# Patient Record
Sex: MALE | Race: WHITE | Employment: UNEMPLOYED | URBAN - METROPOLITAN AREA
[De-identification: names, ages, dates, MRNs, and addresses within clinical notes are randomized per-mention and may not be internally consistent; named-entity substitution may affect disease eponyms.]

---

## 2017-01-04 ENCOUNTER — TELEPHONE (OUTPATIENT)
Dept: PULMONOLOGY | Facility: CLINIC | Age: 42
End: 2017-01-04

## 2017-01-04 NOTE — TELEPHONE ENCOUNTER
Per pts mother, pt broke femur and is in rehab at [facility] in [place].  Pt will follow up with surgeon on 1/17/17. He needs to have form filled out for participation in [employer] and form expires 2/2/17.   Can pt be seen sometime between 1/17/17 a

## 2017-01-04 NOTE — TELEPHONE ENCOUNTER
Pt mother offered appt Mon 1/30 @ 4:45 pm, pt mother declined appt and stts she will cb at a later time.

## 2017-01-14 ENCOUNTER — NURSE ONLY (OUTPATIENT)
Dept: ALLERGY | Facility: CLINIC | Age: 42
End: 2017-01-14

## 2017-01-14 DIAGNOSIS — J30.89 ENVIRONMENTAL AND SEASONAL ALLERGIES: Primary | ICD-10-CM

## 2017-01-14 PROCEDURE — 95165 ANTIGEN THERAPY SERVICES: CPT | Performed by: ALLERGY & IMMUNOLOGY

## 2017-01-14 PROCEDURE — 95117 IMMUNOTHERAPY INJECTIONS: CPT | Performed by: ALLERGY & IMMUNOLOGY

## 2017-01-24 ENCOUNTER — TELEPHONE (OUTPATIENT)
Dept: PULMONOLOGY | Facility: CLINIC | Age: 42
End: 2017-01-24

## 2017-01-24 NOTE — TELEPHONE ENCOUNTER
Letter mailed to pt regarding MRI ABDOMEN due Feb 2017 from [facility], msg routed to [facility] please process authorization for CT.

## 2017-02-18 ENCOUNTER — OFFICE VISIT (OUTPATIENT)
Dept: ALLERGY | Facility: CLINIC | Age: 42
End: 2017-02-18

## 2017-02-18 ENCOUNTER — TELEPHONE (OUTPATIENT)
Dept: FAMILY MEDICINE CLINIC | Facility: CLINIC | Age: 42
End: 2017-02-18

## 2017-02-18 VITALS
SYSTOLIC BLOOD PRESSURE: 112 MMHG | DIASTOLIC BLOOD PRESSURE: 62 MMHG | BODY MASS INDEX: 22.13 KG/M2 | WEIGHT: 141 LBS | HEART RATE: 87 BPM | TEMPERATURE: 97 F | HEIGHT: 67 IN | RESPIRATION RATE: 18 BRPM

## 2017-02-18 DIAGNOSIS — J30.89 OTHER ALLERGIC RHINITIS: ICD-10-CM

## 2017-02-18 DIAGNOSIS — J30.1 SEASONAL ALLERGIC RHINITIS DUE TO POLLEN: Primary | ICD-10-CM

## 2017-02-18 PROCEDURE — G0463 HOSPITAL OUTPT CLINIC VISIT: HCPCS | Performed by: ALLERGY & IMMUNOLOGY

## 2017-02-18 PROCEDURE — 99214 OFFICE O/P EST MOD 30 MIN: CPT | Performed by: ALLERGY & IMMUNOLOGY

## 2017-02-18 RX ORDER — CARBAMAZEPINE 100 MG/1
400 TABLET, CHEWABLE ORAL
COMMUNITY

## 2017-02-18 RX ORDER — CARBAMAZEPINE 100 MG/1
300 TABLET, CHEWABLE ORAL
COMMUNITY

## 2017-02-18 RX ORDER — CARBAMAZEPINE 200 MG/1
400 TABLET ORAL
COMMUNITY

## 2017-02-18 RX ORDER — LOPERAMIDE HYDROCHLORIDE 2 MG/1
4 CAPSULE ORAL
COMMUNITY
End: 2021-04-05 | Stop reason: ALTCHOICE

## 2017-02-18 RX ORDER — M-VIT,TX,IRON,MINS/CALC/FOLIC 27MG-0.4MG
1 TABLET ORAL
COMMUNITY

## 2017-02-18 RX ORDER — PRIMIDONE 50 MG/1
200 TABLET ORAL
COMMUNITY

## 2017-02-18 NOTE — PROGRESS NOTES
Efren Ledesma is a 36year old male. HPI:   No chief complaint on file. Patient is a 40-year-old male who presents for follow-up with his mom with a chief complaint of allergies.     Patient last seen by me on December 30, 2015    Patient has a his 200 MG Oral Tab Take  by mouth. Disp:  Rfl:    EPINEPHrine (EPIPEN 2-SHADI) 0.3 MG/0.3ML Injection Solution Auto-injector inject 1 by Intramuscular route Disp:  Rfl:    Fluocinolone Acetonide (DERMOTIC) 0.01 % Otic Oil Place  in ear(s).  Disp:  Rfl:    gabape mucous membranes are moist   Neck/Thyroid: neck is supple without adenopathy  Lymphatic: no abnormal cervical, supraclavicular or axillary adenopathy is noted  Respiratory: normal to inspection lungs are clear to auscultation bilaterally normal respiratory

## 2017-02-18 NOTE — TELEPHONE ENCOUNTER
DRAGAN WAS SEEN ON 2/18 BY JED AND A SUMMARY NEEDS TO BE SENT TO [place] [address] FOR HIS VISIT. THE FAX  IS [phone].

## 2017-02-20 NOTE — TELEPHONE ENCOUNTER
LOV note from 2/08/17 faxed to [facility] [phone] with verification received of successful transmission.

## 2017-03-25 ENCOUNTER — HOSPITAL ENCOUNTER (OUTPATIENT)
Dept: MRI IMAGING | Facility: HOSPITAL | Age: 42
Discharge: HOME | End: 2017-03-25
Attending: INTERNAL MEDICINE
Payer: MEDICARE

## 2017-03-25 DIAGNOSIS — N28.89 KIDNEY MASS: ICD-10-CM

## 2017-03-25 LAB — CREAT BLD-MCNC: 1 MG/DL (ref 0.5–1.5)

## 2017-03-25 PROCEDURE — A9575 INJ GADOTERATE MEGLUMI 0.1ML: HCPCS | Performed by: INTERNAL MEDICINE

## 2017-03-25 PROCEDURE — 74183 MRI ABD W/O CNTR FLWD CNTR: CPT

## 2017-03-25 PROCEDURE — 82565 ASSAY OF CREATININE: CPT

## 2017-03-27 ENCOUNTER — TELEPHONE (OUTPATIENT)
Dept: PULMONOLOGY | Facility: CLINIC | Age: 42
End: 2017-03-27

## 2017-03-27 DIAGNOSIS — N28.89 KIDNEY MASS: Primary | ICD-10-CM

## 2017-03-27 NOTE — TELEPHONE ENCOUNTER
Left message for patient's mother Jessikalisa Swensons to call back.  Please transfer to x [phone]

## 2017-03-27 NOTE — TELEPHONE ENCOUNTER
----- Message from Huyen Lam MD sent at 3/26/2017  8:32 PM CDT -----  RN, these call the patient's mother to explain that the MRI of the abdomen demonstrates stability of the renal abnormalities.   Please arrange for a repeat MRI of the kidneys at th

## 2017-03-28 NOTE — TELEPHONE ENCOUNTER
Notified pt's mom of Dr. Smiley Sommers orders. Explained rx for repeat MRI will be mailed out about 1 mo prior to due date and two copies of MRI results will be mailed to home address per her request. She verbalized understanding.  Mailed results as requested and

## 2017-04-10 ENCOUNTER — OFFICE VISIT (OUTPATIENT)
Dept: PULMONOLOGY | Facility: CLINIC | Age: 42
End: 2017-04-10

## 2017-04-10 VITALS
DIASTOLIC BLOOD PRESSURE: 73 MMHG | OXYGEN SATURATION: 100 % | HEIGHT: 67 IN | RESPIRATION RATE: 18 BRPM | WEIGHT: 144.63 LBS | SYSTOLIC BLOOD PRESSURE: 123 MMHG | BODY MASS INDEX: 22.7 KG/M2 | HEART RATE: 85 BPM

## 2017-04-10 DIAGNOSIS — D64.9 ANEMIA, UNSPECIFIED TYPE: ICD-10-CM

## 2017-04-10 DIAGNOSIS — I10 ESSENTIAL HYPERTENSION: Primary | ICD-10-CM

## 2017-04-10 DIAGNOSIS — E78.5 DYSLIPIDEMIA: ICD-10-CM

## 2017-04-10 DIAGNOSIS — G40.802 OTHER EPILEPSY WITHOUT STATUS EPILEPTICUS, NOT INTRACTABLE (HCC): ICD-10-CM

## 2017-04-10 PROCEDURE — G0463 HOSPITAL OUTPT CLINIC VISIT: HCPCS | Performed by: INTERNAL MEDICINE

## 2017-04-10 PROCEDURE — 99213 OFFICE O/P EST LOW 20 MIN: CPT | Performed by: INTERNAL MEDICINE

## 2017-04-10 NOTE — PROGRESS NOTES
The patient is 79-year-old male who I know well from prior evaluations who comes in now for follow-up. The patient was hospitalized after fracturing his femur during a [employer] basketball game. He had surgical repair.   He convalesced at [facility]

## 2017-04-11 ENCOUNTER — TELEPHONE (OUTPATIENT)
Dept: ALLERGY | Facility: CLINIC | Age: 42
End: 2017-04-11

## 2017-04-11 NOTE — TELEPHONE ENCOUNTER
Mother calling states pt serum will  this week. Pt will need new rx for serum. Please call when serum is ready for pickup, please leave detailed message on voice mail. Pt leaves out of states and she needs to  latest on .

## 2017-04-11 NOTE — TELEPHONE ENCOUNTER
Left detailed message on answering machine as requested. Serum will be ready for  Thursday the 13th in the afternoon.

## 2017-04-13 ENCOUNTER — TELEPHONE (OUTPATIENT)
Dept: ALLERGY | Facility: CLINIC | Age: 42
End: 2017-04-13

## 2017-04-13 ENCOUNTER — NURSE ONLY (OUTPATIENT)
Dept: ALLERGY | Facility: CLINIC | Age: 42
End: 2017-04-13

## 2017-04-13 DIAGNOSIS — J30.89 ENVIRONMENTAL AND SEASONAL ALLERGIES: Primary | ICD-10-CM

## 2017-04-13 PROCEDURE — 95165 ANTIGEN THERAPY SERVICES: CPT | Performed by: ALLERGY & IMMUNOLOGY

## 2017-04-13 NOTE — TELEPHONE ENCOUNTER
Mother of pt picked up serum and consent forms signed and residence address and phone number verified. Mother reminded to keep serum refrigerated.  aware and signed forms. Routed to  for sign off.

## 2017-04-13 NOTE — PROGRESS NOTES
Pt's mother here to  serum for AIT to be taken to [address] [place] [address]. Dr aware, paperwork signed, consent signed and signed by Dr. Mother reminded that serum needs to be kept refrigerated. Original injection record given to mother.

## 2017-04-18 ENCOUNTER — TELEPHONE (OUTPATIENT)
Dept: ALLERGY | Facility: CLINIC | Age: 42
End: 2017-04-18

## 2017-04-18 NOTE — TELEPHONE ENCOUNTER
Left message on RN direct line to have past injection sheet faxed to our department. Gave our fax number.

## 2017-07-02 ENCOUNTER — LAB ENCOUNTER (OUTPATIENT)
Dept: LAB | Facility: HOSPITAL | Age: 42
End: 2017-07-02
Attending: ORTHOPAEDIC SURGERY
Payer: MEDICARE

## 2017-07-02 DIAGNOSIS — E78.5 DYSLIPIDEMIA: ICD-10-CM

## 2017-07-02 DIAGNOSIS — D64.9 ANEMIA, UNSPECIFIED TYPE: ICD-10-CM

## 2017-07-02 DIAGNOSIS — G40.802 OTHER EPILEPSY WITHOUT STATUS EPILEPTICUS, NOT INTRACTABLE (HCC): ICD-10-CM

## 2017-07-02 DIAGNOSIS — I10 ESSENTIAL HYPERTENSION: ICD-10-CM

## 2017-07-02 LAB
ALBUMIN SERPL BCP-MCNC: 4 G/DL (ref 3.5–4.8)
ALBUMIN/GLOB SERPL: 1.3 {RATIO} (ref 1–2)
ALP SERPL-CCNC: 79 U/L (ref 32–100)
ALT SERPL-CCNC: 23 U/L (ref 17–63)
ANION GAP SERPL CALC-SCNC: 9 MMOL/L (ref 0–18)
AST SERPL-CCNC: 25 U/L (ref 15–41)
BACTERIA UR QL AUTO: NEGATIVE /HPF
BASOPHILS # BLD: 0 K/UL (ref 0–0.2)
BASOPHILS NFR BLD: 1 %
BILIRUB SERPL-MCNC: 0.5 MG/DL (ref 0.3–1.2)
BILIRUB UR QL: NEGATIVE
BUN SERPL-MCNC: 14 MG/DL (ref 8–20)
BUN/CREAT SERPL: 15.9 (ref 10–20)
CALCIUM SERPL-MCNC: 8.9 MG/DL (ref 8.5–10.5)
CARBAMAZEPINE SERPL-MCNC: 7.1 MCG/ML (ref 4–12)
CHLORIDE SERPL-SCNC: 98 MMOL/L (ref 95–110)
CHOLEST SERPL-MCNC: 220 MG/DL (ref 110–200)
CLARITY UR: CLEAR
CO2 SERPL-SCNC: 28 MMOL/L (ref 22–32)
COLOR UR: YELLOW
CREAT SERPL-MCNC: 0.88 MG/DL (ref 0.5–1.5)
EOSINOPHIL # BLD: 0.1 K/UL (ref 0–0.7)
EOSINOPHIL NFR BLD: 2 %
ERYTHROCYTE [DISTWIDTH] IN BLOOD BY AUTOMATED COUNT: 13.4 % (ref 11–15)
GLOBULIN PLAS-MCNC: 3.1 G/DL (ref 2.5–3.7)
GLUCOSE SERPL-MCNC: 90 MG/DL (ref 70–99)
GLUCOSE UR-MCNC: NEGATIVE MG/DL
HCT VFR BLD AUTO: 41.1 % (ref 41–52)
HDLC SERPL-MCNC: 60 MG/DL
HGB BLD-MCNC: 14.2 G/DL (ref 13.5–17.5)
HGB UR QL STRIP.AUTO: NEGATIVE
KETONES UR-MCNC: NEGATIVE MG/DL
LDLC SERPL CALC-MCNC: 143 MG/DL (ref 0–99)
LYMPHOCYTES # BLD: 1.4 K/UL (ref 1–4)
LYMPHOCYTES NFR BLD: 33 %
MCH RBC QN AUTO: 30.6 PG (ref 27–32)
MCHC RBC AUTO-ENTMCNC: 34.6 G/DL (ref 32–37)
MCV RBC AUTO: 88.5 FL (ref 80–100)
MONOCYTES # BLD: 0.5 K/UL (ref 0–1)
MONOCYTES NFR BLD: 11 %
NEUTROPHILS # BLD AUTO: 2.3 K/UL (ref 1.8–7.7)
NEUTROPHILS NFR BLD: 53 %
NITRITE UR QL STRIP.AUTO: NEGATIVE
NONHDLC SERPL-MCNC: 160 MG/DL
OSMOLALITY UR CALC.SUM OF ELEC: 280 MOSM/KG (ref 275–295)
PH UR: 7 [PH] (ref 5–8)
PLATELET # BLD AUTO: 251 K/UL (ref 140–400)
PMV BLD AUTO: 7.4 FL (ref 7.4–10.3)
POTASSIUM SERPL-SCNC: 3.8 MMOL/L (ref 3.3–5.1)
PROT SERPL-MCNC: 7.1 G/DL (ref 5.9–8.4)
PROT UR-MCNC: NEGATIVE MG/DL
RBC # BLD AUTO: 4.64 M/UL (ref 4.5–5.9)
RBC #/AREA URNS AUTO: <1 /HPF
SODIUM SERPL-SCNC: 135 MMOL/L (ref 136–144)
SP GR UR STRIP: 1.02 (ref 1–1.03)
TRIGL SERPL-MCNC: 85 MG/DL (ref 1–149)
UROBILINOGEN UR STRIP-ACNC: <2
WBC # BLD AUTO: 4.3 K/UL (ref 4–11)
WBC #/AREA URNS AUTO: <1 /HPF

## 2017-07-02 PROCEDURE — 80061 LIPID PANEL: CPT

## 2017-07-02 PROCEDURE — 80053 COMPREHEN METABOLIC PANEL: CPT

## 2017-07-02 PROCEDURE — 80156 ASSAY CARBAMAZEPINE TOTAL: CPT

## 2017-07-02 PROCEDURE — 80188 ASSAY OF PRIMIDONE: CPT

## 2017-07-02 PROCEDURE — 85025 COMPLETE CBC W/AUTO DIFF WBC: CPT

## 2017-07-02 PROCEDURE — 36415 COLL VENOUS BLD VENIPUNCTURE: CPT

## 2017-07-02 PROCEDURE — 80184 ASSAY OF PHENOBARBITAL: CPT

## 2017-07-04 LAB
PHENOBARBITAL: 9.2 UG/ML
PRIMIDONE (MYSOLINE): 4.5 UG/ML

## 2017-07-10 ENCOUNTER — TELEPHONE (OUTPATIENT)
Dept: PULMONOLOGY | Facility: CLINIC | Age: 42
End: 2017-07-10

## 2017-07-10 DIAGNOSIS — E78.00 ELEVATED CHOLESTEROL: Primary | ICD-10-CM

## 2017-07-10 NOTE — TELEPHONE ENCOUNTER
----- Message from Jeanette Phoenix, MD sent at 7/7/2017  8:52 PM CDT -----  RN, please call the patient's mother to let her know that the drug levels were good.     Notes Recorded by Makenna Loya MD on 7/3/2017 at 10:47 PM CDT  RN, please call the [place]

## 2017-07-10 NOTE — TELEPHONE ENCOUNTER
----- Message from Genna Gonzales MD sent at 7/3/2017 10:47 PM CDT -----  RN, please call the patient's mother to explain that all the lab testing is fine except the cholesterol is up a little bit to 220.   Please increase the pravastatin from 10 mg to 20

## 2017-07-11 NOTE — TELEPHONE ENCOUNTER
Notified Ovidio of Dr. Shae Adam orders. Pt's mom requests all lab results from 7/2 be mailed to her home.  She stts she would like to know if increase in Pravastatin is necessary since pt has not been active, he has osteotomy sched on 7/24, & it is ok to keon

## 2017-07-11 NOTE — TELEPHONE ENCOUNTER
Notified pt's mom of Dr. Heri Norton orders. Explained lab orders will be mailed out about 1 mo prior to due date & I will have MD send script to [facility] per her request. She verbalized understanding. Rx(s) filed in Chronic Calendar.  Dr. Coral Torres- pls sign o

## 2017-07-11 NOTE — TELEPHONE ENCOUNTER
RN, I definitely would increase the pravastatin still. There is no point in taking medication unless it is adequately dosed. Certainly, you can send her the lab results.

## 2017-07-12 RX ORDER — PRAVASTATIN SODIUM 20 MG
20 TABLET ORAL NIGHTLY
Qty: 30 TABLET | Refills: 5 | Status: SHIPPED | OUTPATIENT
Start: 2017-07-12

## 2017-07-13 ENCOUNTER — TELEPHONE (OUTPATIENT)
Dept: PULMONOLOGY | Facility: CLINIC | Age: 42
End: 2017-07-13

## 2017-07-13 NOTE — TELEPHONE ENCOUNTER
Pts mother called with fax:[phone] number for updating. Pls fax new RX:Pravastatin Sodium  Mother also indicates she has not received lab results, asking to [facility] fax lab results as well, any questions pls call at: [phone],FZQF.   Mother also sidra

## 2017-07-14 ENCOUNTER — TELEPHONE (OUTPATIENT)
Dept: PULMONOLOGY | Facility: CLINIC | Age: 42
End: 2017-07-14

## 2017-07-14 NOTE — TELEPHONE ENCOUNTER
Current Outpatient Prescriptions:  lisinopril (PRINIVIL,ZESTRIL) 20 MG Oral Tab Take  by mouth.  Disp:  Rfl:      Refill

## 2017-07-14 NOTE — TELEPHONE ENCOUNTER
Spoke to David Carcamo re: request. She verbalized understanding. Pt's mom stts C[facility] handles Minesh's medications. She stts she will be going to [place], she will d/w nurse, & only f/u with us if pt needs medication.

## 2017-08-14 ENCOUNTER — NURSE ONLY (OUTPATIENT)
Dept: ALLERGY | Facility: CLINIC | Age: 42
End: 2017-08-14

## 2017-08-14 DIAGNOSIS — J30.89 ENVIRONMENTAL AND SEASONAL ALLERGIES: ICD-10-CM

## 2017-08-14 PROCEDURE — 95117 IMMUNOTHERAPY INJECTIONS: CPT | Performed by: ALLERGY & IMMUNOLOGY

## 2017-08-21 ENCOUNTER — TELEPHONE (OUTPATIENT)
Dept: PULMONOLOGY | Facility: CLINIC | Age: 42
End: 2017-08-21

## 2017-08-21 NOTE — TELEPHONE ENCOUNTER
[facility] calling to let Dr know that pt is [facility]ing [facility]

## 2017-10-05 ENCOUNTER — TELEPHONE (OUTPATIENT)
Dept: ALLERGY | Facility: CLINIC | Age: 42
End: 2017-10-05

## 2017-10-05 NOTE — TELEPHONE ENCOUNTER
Patient need more allergy serum -  He need more in a few weeks. David Rosenberg serum is expiring need new vials. Can  next monday   evening or Saturday within the next two to three weeks.

## 2017-10-19 ENCOUNTER — APPOINTMENT (OUTPATIENT)
Dept: ALLERGY | Facility: CLINIC | Age: 42
End: 2017-10-19

## 2017-10-19 DIAGNOSIS — J30.89 ENVIRONMENTAL AND SEASONAL ALLERGIES: ICD-10-CM

## 2017-10-19 PROCEDURE — 95165 ANTIGEN THERAPY SERVICES: CPT | Performed by: ALLERGY & IMMUNOLOGY

## 2017-10-19 NOTE — TELEPHONE ENCOUNTER
Mother in to  serum and take to patient's [facility] health services. Consent signed and reminded to keep serum refrigerated. Encounter for nurse visit made to charge for serum release.

## 2017-11-04 ENCOUNTER — TELEPHONE (OUTPATIENT)
Dept: ALLERGY | Facility: CLINIC | Age: 42
End: 2017-11-04

## 2017-11-04 NOTE — TELEPHONE ENCOUNTER
Received AIT injection record from [facility]. Placed in pt AIT chart for Ana Arceo to review.

## 2017-12-29 ENCOUNTER — TELEPHONE (OUTPATIENT)
Dept: PULMONOLOGY | Facility: CLINIC | Age: 42
End: 2017-12-29

## 2018-01-21 ENCOUNTER — LAB ENCOUNTER (OUTPATIENT)
Dept: LAB | Facility: HOSPITAL | Age: 43
End: 2018-01-21
Attending: Other
Payer: MEDICARE

## 2018-01-21 DIAGNOSIS — G40.909 EPILEPSY (HCC): Primary | ICD-10-CM

## 2018-01-21 DIAGNOSIS — E78.00 ELEVATED CHOLESTEROL: ICD-10-CM

## 2018-01-21 LAB
ALBUMIN SERPL BCP-MCNC: 4.1 G/DL (ref 3.5–4.8)
ALP SERPL-CCNC: 68 U/L (ref 32–100)
ALT SERPL-CCNC: 23 U/L (ref 17–63)
ANION GAP SERPL CALC-SCNC: 8 MMOL/L (ref 0–18)
AST SERPL-CCNC: 24 U/L (ref 15–41)
BASOPHILS # BLD: 0 K/UL (ref 0–0.2)
BASOPHILS NFR BLD: 1 %
BILIRUB DIRECT SERPL-MCNC: 0 MG/DL (ref 0–0.2)
BILIRUB SERPL-MCNC: 0.6 MG/DL (ref 0.3–1.2)
BUN SERPL-MCNC: 16 MG/DL (ref 8–20)
BUN/CREAT SERPL: 18.4 (ref 10–20)
CALCIUM SERPL-MCNC: 8.7 MG/DL (ref 8.5–10.5)
CARBAMAZEPINE SERPL-MCNC: 7.4 MCG/ML (ref 4–12)
CHLORIDE SERPL-SCNC: 98 MMOL/L (ref 95–110)
CHOLEST SERPL-MCNC: 202 MG/DL (ref 110–200)
CO2 SERPL-SCNC: 29 MMOL/L (ref 22–32)
CREAT SERPL-MCNC: 0.87 MG/DL (ref 0.5–1.5)
EOSINOPHIL # BLD: 0.1 K/UL (ref 0–0.7)
EOSINOPHIL NFR BLD: 2 %
ERYTHROCYTE [DISTWIDTH] IN BLOOD BY AUTOMATED COUNT: 13.6 % (ref 11–15)
GLUCOSE SERPL-MCNC: 92 MG/DL (ref 70–99)
HCT VFR BLD AUTO: 41.8 % (ref 41–52)
HDLC SERPL-MCNC: 53 MG/DL
HGB BLD-MCNC: 14.1 G/DL (ref 13.5–17.5)
LDLC SERPL CALC-MCNC: 128 MG/DL (ref 0–99)
LYMPHOCYTES # BLD: 1.3 K/UL (ref 1–4)
LYMPHOCYTES NFR BLD: 31 %
MCH RBC QN AUTO: 30.1 PG (ref 27–32)
MCHC RBC AUTO-ENTMCNC: 33.7 G/DL (ref 32–37)
MCV RBC AUTO: 89.3 FL (ref 80–100)
MONOCYTES # BLD: 0.4 K/UL (ref 0–1)
MONOCYTES NFR BLD: 11 %
NEUTROPHILS # BLD AUTO: 2.3 K/UL (ref 1.8–7.7)
NEUTROPHILS NFR BLD: 55 %
NONHDLC SERPL-MCNC: 149 MG/DL
OSMOLALITY UR CALC.SUM OF ELEC: 281 MOSM/KG (ref 275–295)
PHENOBARB SERPL-MCNC: 9.1 MCG/ML (ref 15–40)
PLATELET # BLD AUTO: 266 K/UL (ref 140–400)
PMV BLD AUTO: 7.2 FL (ref 7.4–10.3)
POTASSIUM SERPL-SCNC: 4 MMOL/L (ref 3.3–5.1)
PROT SERPL-MCNC: 7.3 G/DL (ref 5.9–8.4)
RBC # BLD AUTO: 4.68 M/UL (ref 4.5–5.9)
SODIUM SERPL-SCNC: 135 MMOL/L (ref 136–144)
TRIGL SERPL-MCNC: 107 MG/DL (ref 1–149)
WBC # BLD AUTO: 4.1 K/UL (ref 4–11)

## 2018-01-21 PROCEDURE — 80061 LIPID PANEL: CPT

## 2018-01-21 PROCEDURE — 80184 ASSAY OF PHENOBARBITAL: CPT

## 2018-01-21 PROCEDURE — 80076 HEPATIC FUNCTION PANEL: CPT

## 2018-01-21 PROCEDURE — 85025 COMPLETE CBC W/AUTO DIFF WBC: CPT

## 2018-01-21 PROCEDURE — 80156 ASSAY CARBAMAZEPINE TOTAL: CPT

## 2018-01-21 PROCEDURE — 36415 COLL VENOUS BLD VENIPUNCTURE: CPT

## 2018-01-21 PROCEDURE — 80188 ASSAY OF PRIMIDONE: CPT

## 2018-01-21 PROCEDURE — 80171 DRUG SCREEN QUANT GABAPENTIN: CPT

## 2018-01-21 PROCEDURE — 80048 BASIC METABOLIC PNL TOTAL CA: CPT

## 2018-01-23 LAB
PHENOBARBITAL: 10.3 UG/ML
PRIMIDONE (MYSOLINE): 4.3 UG/ML

## 2018-01-25 LAB — GABAPENTIN LEVEL: 4.4 UG/ML

## 2018-01-29 ENCOUNTER — TELEPHONE (OUTPATIENT)
Dept: PULMONOLOGY | Facility: CLINIC | Age: 43
End: 2018-01-29

## 2018-01-29 NOTE — TELEPHONE ENCOUNTER
----- Message from Xiomy Lopez MD sent at 1/28/2018  5:21 PM CST -----  RN,  Please blayne the mother Melody Perales to let her know that the values are acceptable and the cholester is down from last testing to 202.   Also, fax results to Neurologist Dr. Jonathan Hurt

## 2018-01-29 NOTE — TELEPHONE ENCOUNTER
Pt mother Mark Cardoso informed of results, faxed to neurologist Dr. Prentice Leventhal and [facility] at [phone] as mother requested, and a copy of lab results placed in outgoing mail.

## 2018-02-15 ENCOUNTER — TELEPHONE (OUTPATIENT)
Dept: PULMONOLOGY | Facility: CLINIC | Age: 43
End: 2018-02-15

## 2018-03-02 ENCOUNTER — TELEPHONE (OUTPATIENT)
Dept: ALLERGY | Facility: CLINIC | Age: 43
End: 2018-03-02

## 2018-03-02 NOTE — TELEPHONE ENCOUNTER
[address] (pt's nurse) is calling to inform DR that pt did  Receive his allergy shots today except one. She states he didn't received the ragweed b/c it  on 18. She is calling to obtain directions. pls advise.  Thank you

## 2018-03-03 NOTE — TELEPHONE ENCOUNTER
Left message for nurse DARRYL Solares to call back. Office open until noon today. Please transfer call to RN.

## 2018-03-03 NOTE — TELEPHONE ENCOUNTER
Dr. Cheryle Ripple,    Please see message below from [place], [address] [facility]. Per AIT records, patient currently on Red (1:100/10,000 BAU) maintenance every 4 weeks for mold, dust mites, ragweed (injection 1) & trees, grass, weeds (injection 2). Please advise.

## 2018-03-03 NOTE — TELEPHONE ENCOUNTER
Call transferred by [facility]. Per mom, spoke with DARRYL Solares regarding  Ragweed serum. Mother stated patient's  AIT serums are all expiring end of . Mother requesting for new AIT serums. Stated would like to  serums for Gloria Cordoba on 3/12/18. Per mom, she can only come  serums either on a Monday or Saturday d/t her work schedule. Mom aware that our office will contact [facility] again on Monday regarding giving Ragweed injection out of current vial if that still have it. We will contact her once new AIT serums are ready for . Message will be relayed to Ramakrishna Amaya RN regarding new AIT serum request for Gloria Cordoba. She verbalized understanding.

## 2018-03-05 NOTE — TELEPHONE ENCOUNTER
Spoke with Eveline RN told her she may give Jorge Eddy his ragweed dose today, Dr Kandi Lantigua use of serum. Nurse states pt received his last ragweed injection on 2/1/2018 and they held it when he was in on 3/1/2018. Pt did receive the other allergens at that time. Told nurse, will be making pt's new vials this week and sending them with mother as requested on the 12th of March. Routed to  for sign off.

## 2018-03-08 ENCOUNTER — TELEPHONE (OUTPATIENT)
Dept: ALLERGY | Facility: CLINIC | Age: 43
End: 2018-03-08

## 2018-03-08 NOTE — TELEPHONE ENCOUNTER
Left message that serum is ready and in the dept. refrigerator and that pt is due to see Dr for yearly AIT visit.

## 2018-03-12 ENCOUNTER — NURSE ONLY (OUTPATIENT)
Dept: ALLERGY | Facility: CLINIC | Age: 43
End: 2018-03-12

## 2018-03-12 DIAGNOSIS — J30.89 ENVIRONMENTAL AND SEASONAL ALLERGIES: ICD-10-CM

## 2018-03-12 PROCEDURE — 95165 ANTIGEN THERAPY SERVICES: CPT | Performed by: ALLERGY & IMMUNOLOGY

## 2018-03-12 NOTE — PROGRESS NOTES
Mother here to  serum for pt's allergy injections done at [facility]. Reminded to keep serum refrigerated and injection sheet included.

## 2018-03-30 ENCOUNTER — HOSPITAL ENCOUNTER (OUTPATIENT)
Dept: MRI IMAGING | Facility: HOSPITAL | Age: 43
Discharge: HOME | End: 2018-03-30
Attending: INTERNAL MEDICINE
Payer: MEDICARE

## 2018-03-30 DIAGNOSIS — N28.89 KIDNEY MASS: ICD-10-CM

## 2018-03-30 PROCEDURE — A9576 INJ PROHANCE MULTIPACK: HCPCS | Performed by: INTERNAL MEDICINE

## 2018-03-30 PROCEDURE — 82565 ASSAY OF CREATININE: CPT

## 2018-03-30 PROCEDURE — 74183 MRI ABD W/O CNTR FLWD CNTR: CPT | Performed by: INTERNAL MEDICINE

## 2018-04-02 ENCOUNTER — TELEPHONE (OUTPATIENT)
Dept: PULMONOLOGY | Facility: CLINIC | Age: 43
End: 2018-04-02

## 2018-04-02 NOTE — TELEPHONE ENCOUNTER
----- Message from Kailee Anand MD sent at 3/31/2018  9:28 PM CDT -----  RN, please call the mother to let her know that the MRI of the abdomen showed that the kidney lesions were stable.   Please add to the calendar a repeat MRI of the abdomen at the two-year interval.

## 2018-04-02 NOTE — TELEPHONE ENCOUNTER
Patient's mother was called. LMTCB regarding MRI of Abdomen results. Please transfer call to Pulmonary RN at ext. [phone].  Thanks !!

## 2018-04-02 NOTE — TELEPHONE ENCOUNTER
Patient's mother was called. Message from Dr. Leroy Fischer dated 03/31/18 @ 9:28 pm was explained to mother in its entirety. Mrs. Estrada verbalized understanding and agreed to recommendations made by MD. No further questions / concerns at this time.

## 2018-04-17 ENCOUNTER — TELEPHONE (OUTPATIENT)
Dept: PULMONOLOGY | Facility: CLINIC | Age: 43
End: 2018-04-17

## 2018-04-17 NOTE — TELEPHONE ENCOUNTER
Pts Mother/Alicia calling on behalf of pt requesting a letter from  with pts name to indicate that he is development disabled since birth, and that he can not care for himself (pt is being audited). Any questions pls call mother at:[phone],thanks.   *R

## 2018-04-18 NOTE — TELEPHONE ENCOUNTER
Spoke w/ pt's mom re: her request. Explained will d/w MD, f/u, & she may p/u letter when it is ready @ . Hours given (Mon until 6 pm and T & TH until 6:30 pm per Milo Beltran). Skylar Díaz verbalized understanding. She stts ok to leave detailed msg.  Dr. Kelly Wesley

## 2018-04-19 NOTE — TELEPHONE ENCOUNTER
Signed letter placed in envelope w/ pt identifiers as well as his mom's name and filed @  for p/u. Msg left for pt's mom re: the previous & if she has any questions to call me back @ #[phone].

## 2018-04-21 ENCOUNTER — OFFICE VISIT (OUTPATIENT)
Dept: ALLERGY | Facility: CLINIC | Age: 43
End: 2018-04-21

## 2018-04-21 VITALS
HEART RATE: 67 BPM | SYSTOLIC BLOOD PRESSURE: 110 MMHG | RESPIRATION RATE: 16 BRPM | HEIGHT: 67 IN | WEIGHT: 155 LBS | BODY MASS INDEX: 24.33 KG/M2 | DIASTOLIC BLOOD PRESSURE: 76 MMHG | OXYGEN SATURATION: 97 % | TEMPERATURE: 98 F

## 2018-04-21 DIAGNOSIS — J30.1 SEASONAL ALLERGIC RHINITIS DUE TO POLLEN: Primary | ICD-10-CM

## 2018-04-21 DIAGNOSIS — J30.89 OTHER ALLERGIC RHINITIS: ICD-10-CM

## 2018-04-21 PROCEDURE — 99214 OFFICE O/P EST MOD 30 MIN: CPT | Performed by: ALLERGY & IMMUNOLOGY

## 2018-04-21 PROCEDURE — G0463 HOSPITAL OUTPT CLINIC VISIT: HCPCS | Performed by: ALLERGY & IMMUNOLOGY

## 2018-04-21 NOTE — PROGRESS NOTES
Roz Lujan is a 39year old male. HPI:   Patient presents with: Allergies: Pt reports he is feeling well. Has been getting his shots every four weeks. NO complaints to note.      Patient is a 50-year-old male who presents for follow-up with klever sweet Disp: 30 tablet Rfl: 5   primidone 50 MG Oral Tab Take 200 mg by mouth. Disp:  Rfl:    Loperamide HCl 2 MG Oral Cap Take 4 mg by mouth. Disp:  Rfl:    THERAPEUTIC MULTIVIT/MINERAL Oral Tab Take 1 tablet by mouth.  Disp:  Rfl:    carbamazepine 100 MG Oral Ch heartbeat/palpitations, chest pain, edema  Constitutional:  Negative night sweats,weight loss, irritability and lethargy  ENMT:  Negative for ear drainage, hearing loss.  See hpi   Eyes:  Negative for eye discharge and vision loss  Gastrointestinal:  Collette Hamilton prescriptions requested or ordered in this encounter    Imaging & Referrals:  None     4/21/2018  Eben Medina MD    If medication samples were provided today, they were provided solely for patient education and training related to self administration

## 2018-05-08 ENCOUNTER — TELEPHONE (OUTPATIENT)
Dept: ALLERGY | Facility: CLINIC | Age: 43
End: 2018-05-08

## 2018-05-08 NOTE — TELEPHONE ENCOUNTER
Forms completed and faxed to [facility] [address] [place] [address]. Confirmation [id number]    Forms sent to scanning into this encounter.

## 2018-05-31 ENCOUNTER — TELEPHONE (OUTPATIENT)
Dept: PULMONOLOGY | Facility: CLINIC | Age: 43
End: 2018-05-31

## 2018-05-31 NOTE — TELEPHONE ENCOUNTER
Den Booker from [id number] [place] [address] Pt will be seen by physical therapist as per dr referral. Any questions please contact viki [phone]

## 2018-06-18 ENCOUNTER — TELEPHONE (OUTPATIENT)
Dept: ALLERGY | Facility: CLINIC | Age: 43
End: 2018-06-18

## 2018-06-18 NOTE — TELEPHONE ENCOUNTER
Pt's mother called in requesting to come in to  pt's grass serum on 7/2. Pt's mother stated that pt's current supply of serum expires on 7/15.   Please advise

## 2018-06-19 NOTE — TELEPHONE ENCOUNTER
Will mix grass on 6/28/2018 and call mother at that time to arrange time to refill for next 6 months.

## 2018-06-28 NOTE — TELEPHONE ENCOUNTER
Left message for Ms. Estrada. Patient's allergy serum ready for . Can come on Monday 7/2/18 9 AM-Noon or 1 PM-7 PM; Tuesday 7/3/18 9 AM-11:30 AM.    Office closed on 7/4/18 for Holiday. Please call with any questions or concerns.

## 2018-07-02 ENCOUNTER — NURSE ONLY (OUTPATIENT)
Dept: ALLERGY | Facility: CLINIC | Age: 43
End: 2018-07-02

## 2018-07-02 DIAGNOSIS — J30.89 ENVIRONMENTAL AND SEASONAL ALLERGIES: ICD-10-CM

## 2018-07-02 PROCEDURE — 95165 ANTIGEN THERAPY SERVICES: CPT | Performed by: ALLERGY & IMMUNOLOGY

## 2018-07-02 NOTE — PROGRESS NOTES
Mother here to  allergy grass serum to take to [facility]. Reminded to keep serum refrigerated. Parent stated understanding will return home to refrigerate serum.

## 2018-07-06 ENCOUNTER — TELEPHONE (OUTPATIENT)
Dept: PULMONOLOGY | Facility: CLINIC | Age: 43
End: 2018-07-06

## 2018-07-06 NOTE — TELEPHONE ENCOUNTER
Spoke w/ pt mother Breanna Valenzuela, she gave permission to forward LOV note and last labs lipid and LFT to Isabela/Stan as requested.  Breanna Valenzuela also scheduled an appt for Chanel Barnes to f/u w/ Dr. Breana Bai for 7/23 @ 5 pm. Pt mother requests make note on fax that Chanel Barnes

## 2018-07-06 NOTE — TELEPHONE ENCOUNTER
Seema/Cale requesting report of pts last physical and labs that were completed at visit.   Please fax to [phone] thank you

## 2018-07-17 NOTE — TELEPHONE ENCOUNTER
Mother requesting to have appt changed form 5/23/18 at 5pm to 5/24/18 at 5pm due to family coming in from out of town.  Please call thank you [phone] Mother states it is ok to leave a detailed message

## 2018-07-17 NOTE — TELEPHONE ENCOUNTER
Spoke w/ pt mother Raghavendra Gandara, informed her that Dr. Bret Spurling does not see pt on Tuesdays at 5 pm and that there is no other availability left for the week of 7/23 at this time, will check for cancellations.  Reassured pt mother Raghavendra Gandara that pt was added in addition

## 2018-07-23 ENCOUNTER — OFFICE VISIT (OUTPATIENT)
Dept: PULMONOLOGY | Facility: CLINIC | Age: 43
End: 2018-07-23
Payer: MEDICARE

## 2018-07-23 VITALS
RESPIRATION RATE: 18 BRPM | BODY MASS INDEX: 24.48 KG/M2 | SYSTOLIC BLOOD PRESSURE: 127 MMHG | DIASTOLIC BLOOD PRESSURE: 91 MMHG | WEIGHT: 156 LBS | OXYGEN SATURATION: 99 % | HEIGHT: 67 IN | HEART RATE: 81 BPM

## 2018-07-23 DIAGNOSIS — I10 ESSENTIAL HYPERTENSION: Primary | ICD-10-CM

## 2018-07-23 DIAGNOSIS — E78.5 DYSLIPIDEMIA: ICD-10-CM

## 2018-07-23 DIAGNOSIS — R56.9 SEIZURE (HCC): ICD-10-CM

## 2018-07-23 DIAGNOSIS — D64.9 ANEMIA, UNSPECIFIED TYPE: ICD-10-CM

## 2018-07-23 PROCEDURE — 99213 OFFICE O/P EST LOW 20 MIN: CPT | Performed by: INTERNAL MEDICINE

## 2018-07-23 PROCEDURE — G0463 HOSPITAL OUTPT CLINIC VISIT: HCPCS | Performed by: INTERNAL MEDICINE

## 2018-07-23 NOTE — PROGRESS NOTES
A 48-year-old male who I know well from prior evaluation and comes in now for follow-up. In general, he is doing well. He needs forms completed for [facility] and repeat blood draw. Review of systems: Vision normal on the right blind on the left.  Ea

## 2018-07-26 ENCOUNTER — LAB ENCOUNTER (OUTPATIENT)
Dept: LAB | Facility: HOSPITAL | Age: 43
End: 2018-07-26
Attending: INTERNAL MEDICINE
Payer: MEDICARE

## 2018-07-26 DIAGNOSIS — R56.9 SEIZURE (HCC): ICD-10-CM

## 2018-07-26 DIAGNOSIS — I10 ESSENTIAL HYPERTENSION: ICD-10-CM

## 2018-07-26 DIAGNOSIS — E78.5 DYSLIPIDEMIA: ICD-10-CM

## 2018-07-26 DIAGNOSIS — D64.9 ANEMIA, UNSPECIFIED TYPE: ICD-10-CM

## 2018-07-26 LAB
ALBUMIN SERPL BCP-MCNC: 3.7 G/DL (ref 3.5–4.8)
ALBUMIN/GLOB SERPL: 1 {RATIO} (ref 1–2)
ALP SERPL-CCNC: 73 U/L (ref 32–100)
ALT SERPL-CCNC: 29 U/L (ref 17–63)
ANION GAP SERPL CALC-SCNC: 8 MMOL/L (ref 0–18)
AST SERPL-CCNC: 23 U/L (ref 15–41)
BACTERIA UR QL AUTO: NEGATIVE /HPF
BASOPHILS # BLD: 0 K/UL (ref 0–0.2)
BASOPHILS NFR BLD: 1 %
BILIRUB DIRECT SERPL-MCNC: 0 MG/DL (ref 0–0.2)
BILIRUB SERPL-MCNC: 0.4 MG/DL (ref 0.3–1.2)
BILIRUB UR QL: NEGATIVE
BUN SERPL-MCNC: 11 MG/DL (ref 8–20)
BUN/CREAT SERPL: 12.5 (ref 10–20)
CALCIUM SERPL-MCNC: 8.7 MG/DL (ref 8.5–10.5)
CARBAMAZEPINE SERPL-MCNC: 8 MCG/ML (ref 4–12)
CHLORIDE SERPL-SCNC: 99 MMOL/L (ref 95–110)
CHOLEST SERPL-MCNC: 204 MG/DL (ref 110–200)
CO2 SERPL-SCNC: 27 MMOL/L (ref 22–32)
COLOR UR: YELLOW
CREAT SERPL-MCNC: 0.88 MG/DL (ref 0.5–1.5)
EOSINOPHIL # BLD: 0.1 K/UL (ref 0–0.7)
EOSINOPHIL NFR BLD: 3 %
ERYTHROCYTE [DISTWIDTH] IN BLOOD BY AUTOMATED COUNT: 13 % (ref 11–15)
GLOBULIN PLAS-MCNC: 3.7 G/DL (ref 2.5–3.7)
GLUCOSE SERPL-MCNC: 92 MG/DL (ref 70–99)
GLUCOSE UR-MCNC: NEGATIVE MG/DL
HCT VFR BLD AUTO: 39.3 % (ref 41–52)
HDLC SERPL-MCNC: 57 MG/DL
HGB BLD-MCNC: 13.6 G/DL (ref 13.5–17.5)
HGB UR QL STRIP.AUTO: NEGATIVE
KETONES UR-MCNC: NEGATIVE MG/DL
LDLC SERPL CALC-MCNC: 132 MG/DL (ref 0–99)
LEUKOCYTE ESTERASE UR QL STRIP.AUTO: NEGATIVE
LYMPHOCYTES # BLD: 1.4 K/UL (ref 1–4)
LYMPHOCYTES NFR BLD: 27 %
MCH RBC QN AUTO: 30.3 PG (ref 27–32)
MCHC RBC AUTO-ENTMCNC: 34.6 G/DL (ref 32–37)
MCV RBC AUTO: 87.8 FL (ref 80–100)
MONOCYTES # BLD: 0.6 K/UL (ref 0–1)
MONOCYTES NFR BLD: 11 %
NEUTROPHILS # BLD AUTO: 2.9 K/UL (ref 1.8–7.7)
NEUTROPHILS NFR BLD: 58 %
NITRITE UR QL STRIP.AUTO: NEGATIVE
NONHDLC SERPL-MCNC: 147 MG/DL
OSMOLALITY UR CALC.SUM OF ELEC: 277 MOSM/KG (ref 275–295)
PATIENT FASTING: YES
PH UR: 8 [PH] (ref 5–8)
PLATELET # BLD AUTO: 354 K/UL (ref 140–400)
PMV BLD AUTO: 6.7 FL (ref 7.4–10.3)
POTASSIUM SERPL-SCNC: 3.9 MMOL/L (ref 3.3–5.1)
PROT SERPL-MCNC: 7.4 G/DL (ref 5.9–8.4)
PROT UR-MCNC: NEGATIVE MG/DL
RBC # BLD AUTO: 4.47 M/UL (ref 4.5–5.9)
RBC #/AREA URNS AUTO: 2 /HPF
SODIUM SERPL-SCNC: 134 MMOL/L (ref 136–144)
SP GR UR STRIP: 1.02 (ref 1–1.03)
TRIGL SERPL-MCNC: 76 MG/DL (ref 1–149)
UROBILINOGEN UR STRIP-ACNC: <2
VIT C UR-MCNC: NEGATIVE MG/DL
WBC # BLD AUTO: 5 K/UL (ref 4–11)
WBC #/AREA URNS AUTO: 1 /HPF

## 2018-07-26 PROCEDURE — 82248 BILIRUBIN DIRECT: CPT

## 2018-07-26 PROCEDURE — 80053 COMPREHEN METABOLIC PANEL: CPT

## 2018-07-26 PROCEDURE — 36415 COLL VENOUS BLD VENIPUNCTURE: CPT

## 2018-07-26 PROCEDURE — 81003 URINALYSIS AUTO W/O SCOPE: CPT

## 2018-07-26 PROCEDURE — 80061 LIPID PANEL: CPT

## 2018-07-26 PROCEDURE — 80156 ASSAY CARBAMAZEPINE TOTAL: CPT

## 2018-07-26 PROCEDURE — 80184 ASSAY OF PHENOBARBITAL: CPT

## 2018-07-26 PROCEDURE — 80188 ASSAY OF PRIMIDONE: CPT

## 2018-07-26 PROCEDURE — 85025 COMPLETE CBC W/AUTO DIFF WBC: CPT

## 2018-07-28 LAB
PHENOBARBITAL: 9.5 UG/ML
PRIMIDONE (MYSOLINE): 4.1 UG/ML

## 2018-07-31 ENCOUNTER — TELEPHONE (OUTPATIENT)
Dept: PULMONOLOGY | Facility: CLINIC | Age: 43
End: 2018-07-31

## 2018-07-31 DIAGNOSIS — E78.00 ELEVATED CHOLESTEROL: Primary | ICD-10-CM

## 2018-07-31 NOTE — TELEPHONE ENCOUNTER
----- Message from Paras Jimenez MD sent at 7/26/2018  9:30 PM CDT -----  RN, please call Minesh's mother. The lab tests are all good except the cholesterol has gone up.   Considering his family history, I would like to get the cholesterol under slightl

## 2018-07-31 NOTE — TELEPHONE ENCOUNTER
----- Message from Xiomy Lopez MD sent at 7/30/2018  7:44 AM CDT -----  RN, please call the mother to let her know that the phenobarbital and primidone levels are slightly low, but since they have been effective at this dose for Delrae Holes, we do not need

## 2018-08-01 NOTE — TELEPHONE ENCOUNTER
Dr. Jesse Lee, see below. Pt is currently taking Pravachol 20 mg po daily. Mother would like to know if you still want to increase the Pravachol or if it is okay to watch Minesh's diet with cholesterol \"since it has just increased slightly? \"     Lipid LFT

## 2018-08-03 NOTE — TELEPHONE ENCOUNTER
Pt mother Trey Oliveira informed ok to watch diet per [address]. Requests labs from 7/26 be mailed to home address listed in [employer]. Aware centralized . Pt mother declined [employer] set up. Labs placed in outgoing mail.

## 2018-08-07 ENCOUNTER — TELEPHONE (OUTPATIENT)
Dept: ALLERGY | Facility: CLINIC | Age: 43
End: 2018-08-07

## 2018-08-08 ENCOUNTER — NURSE ONLY (OUTPATIENT)
Dept: ALLERGY | Facility: CLINIC | Age: 43
End: 2018-08-08
Payer: MEDICARE

## 2018-08-08 DIAGNOSIS — J30.89 ENVIRONMENTAL AND SEASONAL ALLERGIES: ICD-10-CM

## 2018-08-08 PROCEDURE — 95165 ANTIGEN THERAPY SERVICES: CPT | Performed by: ALLERGY & IMMUNOLOGY

## 2018-08-08 NOTE — PROGRESS NOTES
Mother picked up allergy serum today in office. Patient not present. Mother signed consent to take vials out to [facility]. Allergy Immunotherapy document signed by Dr. Ewa Cook. Provided copy in envelope. Originals in AIT chart.     Charged for

## 2018-10-15 ENCOUNTER — TELEPHONE (OUTPATIENT)
Dept: PULMONOLOGY | Facility: CLINIC | Age: 43
End: 2018-10-15

## 2018-10-15 NOTE — TELEPHONE ENCOUNTER
Ji Hughes states pt is currently Calcium 950mg , twice/day & Vit D 2000 Units, once/day. States Orthopedics dr wants to discontinue both bc pt has completely healed from rt femur fracture.   However, mum didn't think discontinuing is a great idea - wanted to

## 2018-10-16 NOTE — TELEPHONE ENCOUNTER
Dr. Coral Torres, [facility] message and request below. Please advise.

## 2018-10-16 NOTE — TELEPHONE ENCOUNTER
Camila Pan RN calling and following up the MD order, advised that will send the message again. Will send to the appropriate MD and pool.

## 2018-10-18 NOTE — TELEPHONE ENCOUNTER
Wero Garcia informed of Dr. Kaufman Adam message below. Wero Garcia informed pt is due for lipid and LFT in November 26th 2018. Wero Garcia requesting lipid and LFT orders be faxed to her at [phone]. Orders faxed. Fax confirmation received.

## 2018-10-23 NOTE — TELEPHONE ENCOUNTER
Linda Santizo requesting to speak with RN re: Calcium. States pt's mom wants pt to take Calcium Citrate 400 mg once a day. Pls call. Thank you.

## 2018-10-23 NOTE — TELEPHONE ENCOUNTER
LEFT DETAILED MESSAGE stting per Dr. Navdeep Dominguez ok for patient to take Calcium Citrate 400 mg once daily, cb only if further questions

## 2018-10-25 ENCOUNTER — TELEPHONE (OUTPATIENT)
Dept: PULMONOLOGY | Facility: CLINIC | Age: 43
End: 2018-10-25

## 2018-10-25 NOTE — TELEPHONE ENCOUNTER
Lab orders faxed to Justo Dixon at [facility] fax # [phone] to inform due for testing from chronic calendar for this upcoming month.

## 2018-12-10 ENCOUNTER — TELEPHONE (OUTPATIENT)
Dept: ALLERGY | Facility: CLINIC | Age: 43
End: 2018-12-10

## 2018-12-11 NOTE — TELEPHONE ENCOUNTER
Call reviewed and noted. Patient has been on maintenance dosing every 4 weeks since 2008 and therefore we may consider discontinuing immunotherapy if they are interested in stopping.

## 2018-12-11 NOTE — TELEPHONE ENCOUNTER
Mother questioning if she Dileep Martel should discontinue his allergy injections.  She thought that she discussed with Dr. Grisel Dahl about trailing off of immunotherapy in the winter.        recs from office visit on 4/21/2018   Reviewed with mom and pt that pt has bee

## 2018-12-11 NOTE — TELEPHONE ENCOUNTER
Left message on home phone, mother stated a detailed message could be left on home phone. To call back with any questions.

## 2018-12-17 ENCOUNTER — TELEPHONE (OUTPATIENT)
Dept: PULMONOLOGY | Facility: CLINIC | Age: 43
End: 2018-12-17

## 2018-12-19 ENCOUNTER — TELEPHONE (OUTPATIENT)
Dept: PULMONOLOGY | Facility: CLINIC | Age: 43
End: 2018-12-19

## 2018-12-31 ENCOUNTER — TELEPHONE (OUTPATIENT)
Dept: ALLERGY | Facility: CLINIC | Age: 43
End: 2018-12-31

## 2018-12-31 NOTE — TELEPHONE ENCOUNTER
George Portillo called in from [place] stating that she was advised by pt's mother that pt is to stop receiving allergy shots.   George Portillo is requesting a letter in writing from [address] [place] [address] Court to be faxed to  fax: [phone] to confirm this statement.     Dr. Malcolm Martini

## 2018-12-31 NOTE — TELEPHONE ENCOUNTER
Alessio Fischer called in from [place] stating that she was advised by pt's mother that pt is to stop receiving allergy shots. Alessio Fischer is requesting a letter in writing from [address] [place] [address] Court to be faxed to  fax: [phone] to confirm this statement.     Please advise

## 2018-12-31 NOTE — TELEPHONE ENCOUNTER
Trey Dates Mother contacted at [phone]. Mother informed that Dr. Tanvi almaguer states in 12/10/2018 telephone encounter that pt has been on AIT for over 10 years and may stop shots if mother is okay with that.   Mother states that she would like to hav

## 2019-01-01 ENCOUNTER — EXTERNAL RECORD (OUTPATIENT)
Dept: HEALTH INFORMATION MANAGEMENT | Facility: OTHER | Age: 44
End: 2019-01-01

## 2019-01-02 NOTE — TELEPHONE ENCOUNTER
Letter faxed to [facility] as requested. Successful Fax transmission received on our end. No further action needed at this time.

## 2019-03-26 ENCOUNTER — TELEPHONE (OUTPATIENT)
Dept: GASTROENTEROLOGY | Facility: CLINIC | Age: 44
End: 2019-03-26

## 2019-03-26 ENCOUNTER — TELEPHONE (OUTPATIENT)
Dept: ALLERGY | Facility: CLINIC | Age: 44
End: 2019-03-26

## 2019-03-26 NOTE — TELEPHONE ENCOUNTER
Pt scheduled currently for 4/18/2019. Dr. Charla Trejo will be out of the office that afternoon. Please reschedule patient.

## 2019-03-27 NOTE — TELEPHONE ENCOUNTER
PA approved from 1/1/19-3/26/20.  Approval letter to be faxed to office and parties accessed to request in covermymeds notified of approval.

## 2019-03-27 NOTE — TELEPHONE ENCOUNTER
Medication PA Requested: Tegretol 200 MG                                                         CoverMyMeds Used:  Yes   Key: [address] ID# and group: [id number]  Dx.: History of seizure  Medications tried previously with date and SIG: Maintained on

## 2019-04-04 NOTE — TELEPHONE ENCOUNTER
Dr. Charla Trejo will not be out of office that day. No rescheduling required. No further action needed at this time.

## 2019-04-18 ENCOUNTER — OFFICE VISIT (OUTPATIENT)
Dept: ALLERGY | Facility: CLINIC | Age: 44
End: 2019-04-18
Payer: MEDICARE

## 2019-04-18 VITALS
HEART RATE: 73 BPM | DIASTOLIC BLOOD PRESSURE: 62 MMHG | RESPIRATION RATE: 17 BRPM | SYSTOLIC BLOOD PRESSURE: 122 MMHG | TEMPERATURE: 98 F | OXYGEN SATURATION: 97 %

## 2019-04-18 DIAGNOSIS — J30.89 OTHER ALLERGIC RHINITIS: ICD-10-CM

## 2019-04-18 DIAGNOSIS — J30.1 SEASONAL ALLERGIC RHINITIS DUE TO POLLEN: Primary | ICD-10-CM

## 2019-04-18 PROCEDURE — 99214 OFFICE O/P EST MOD 30 MIN: CPT | Performed by: ALLERGY & IMMUNOLOGY

## 2019-04-18 PROCEDURE — G0463 HOSPITAL OUTPT CLINIC VISIT: HCPCS | Performed by: ALLERGY & IMMUNOLOGY

## 2019-04-18 NOTE — PROGRESS NOTES
Carla Foley is a 38year old male. HPI:   Patient presents with: Allergies: Pt reprots his allergies haven't been bad since discontinuing allergy injections. No symptoms to note.      Patient is a 40-year-old male who presents with mom for follow-u Tab Take 1 tablet by mouth. Disp:  Rfl:    carbamazepine 100 MG Oral Chew Tab Chew 400 mg by mouth. Disp:  Rfl:    AmLODIPine Besylate (NORVASC) 5 MG Oral Tab Take  by mouth. Disp:  Rfl:    carbamazepine (TEGRETOL) 200 MG Oral Tab Take 1,100 mg by mouth. for ear drainage, hearing loss and nasal drainage  Eyes:  Negative for eye discharge and vision loss  Gastrointestinal:  Negative for abdominal pain, diarrhea and vomiting  Integumentary:  Negative for pruritus and rash  Respiratory:  Negative for cough, d of potential adverse side effects as well as potential efficacy. Patient's questions were answered in regards to medication administration and dosing and potential side effects.  Teaching was provided via the teach back method

## 2019-05-28 ENCOUNTER — TELEPHONE (OUTPATIENT)
Dept: PULMONOLOGY | Facility: CLINIC | Age: 44
End: 2019-05-28

## 2019-05-28 RX ORDER — DOCUSATE SODIUM 100 MG/1
100 CAPSULE, LIQUID FILLED ORAL 2 TIMES DAILY
Qty: 60 CAPSULE | Refills: 0 | Status: SHIPPED | OUTPATIENT
Start: 2019-05-28 | End: 2019-06-13

## 2019-05-28 NOTE — TELEPHONE ENCOUNTER
Spoke with Lucia ANDREWS from [facility]. Informed her of Dr. Hiram Sethi message/order below. Lucia ANDREWS requesting order to be faxed to [facility]. Order to be faxed to [facility] once signed by Dr. Kurt Calvillo. Fax number for [facility] [phone].  Advised Sandi

## 2019-05-28 NOTE — TELEPHONE ENCOUNTER
Attempted to to speak to [facility] at [facility] regarding pt. Spoke with Priyanka Craven RN to see how pt is doing, states [facility] [place] went home for the day. Priyanka Craven states she will call pulmo office back with update of pt. Provided pulmo office number [phone].     D

## 2019-05-28 NOTE — TELEPHONE ENCOUNTER
[facility] pt had a lot of straining and blood in stool . States it was a significant amount of blood would like to know if dr Kalpana Anthony wants to place pt on stool softneer or have any testing done.  [facility] pt was seen in urgent care about a week ago for a knee inj

## 2019-05-29 NOTE — TELEPHONE ENCOUNTER
Order for [facility] faxed to [facility]. Fax confirmation received. Left message for [address] at [place] to call pulmo office back.

## 2019-05-30 NOTE — TELEPHONE ENCOUNTER
Spoke with Antonio from [place] regarding faxed order and status of pt. [place] states they did receive fax and pt is doing better, states pt had a small BM with no blood/straining. Dr. Kathy Low regarding pt.

## 2019-06-11 NOTE — TELEPHONE ENCOUNTER
Current Outpatient Medications:  docusate sodium (COLACE) 100 MG Oral Cap Take 1 capsule (100 mg total) by mouth 2 (two) times daily.  Disp: 60 capsule Rfl: 0

## 2019-06-13 RX ORDER — DOCUSATE SODIUM 100 MG/1
100 CAPSULE, LIQUID FILLED ORAL 2 TIMES DAILY
Qty: 60 CAPSULE | Refills: 0 | Status: SHIPPED | OUTPATIENT
Start: 2019-06-13

## 2019-07-29 ENCOUNTER — OFFICE VISIT (OUTPATIENT)
Dept: PULMONOLOGY | Facility: CLINIC | Age: 44
End: 2019-07-29
Payer: MEDICARE

## 2019-07-29 VITALS
OXYGEN SATURATION: 98 % | BODY MASS INDEX: 22.44 KG/M2 | WEIGHT: 143 LBS | HEART RATE: 73 BPM | TEMPERATURE: 98 F | SYSTOLIC BLOOD PRESSURE: 135 MMHG | HEIGHT: 67 IN | DIASTOLIC BLOOD PRESSURE: 83 MMHG | RESPIRATION RATE: 20 BRPM

## 2019-07-29 DIAGNOSIS — E55.9 VITAMIN D DEFICIENCY: Primary | ICD-10-CM

## 2019-07-29 DIAGNOSIS — Q85.1 TUBEROUS SCLEROSIS (HCC): ICD-10-CM

## 2019-07-29 DIAGNOSIS — E78.5 DYSLIPIDEMIA: ICD-10-CM

## 2019-07-29 DIAGNOSIS — I10 ESSENTIAL HYPERTENSION: ICD-10-CM

## 2019-07-29 DIAGNOSIS — D64.9 ANEMIA, UNSPECIFIED TYPE: ICD-10-CM

## 2019-07-29 PROCEDURE — 99213 OFFICE O/P EST LOW 20 MIN: CPT | Performed by: INTERNAL MEDICINE

## 2019-07-29 PROCEDURE — G0463 HOSPITAL OUTPT CLINIC VISIT: HCPCS | Performed by: INTERNAL MEDICINE

## 2019-07-29 NOTE — PROGRESS NOTES
The patient is a 44-year-old male who I know well from prior evaluation of comes in now for follow-up. He has tuberosclerosis and has no new complaints. He has a cataract on his right eye and is going to have surgery shortly. 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

## 2019-09-17 NOTE — TELEPHONE ENCOUNTER
[address] calling for mutual pt regarding labs faxed 8/19 and log of pts BP 9/13 to [facility] office, calling to ensure labs were received, pls call at:[phone],thanks.

## 2019-09-18 NOTE — TELEPHONE ENCOUNTER
Labs received and [facility] pile for review. BP log not received. LM VM notifying Prasanna Wetzel, fax number provided.

## 2019-09-28 NOTE — TELEPHONE ENCOUNTER
RN, please call the [facility] staff back. The blood pressures are running high. Please increase the amlodipine to 10 mg daily and remain on the lisinopril.

## 2019-09-30 RX ORDER — AMLODIPINE BESYLATE 10 MG/1
10 TABLET ORAL DAILY
Qty: 30 TABLET | Refills: 5 | Status: SHIPPED | OUTPATIENT
Start: 2019-09-30 | End: 2020-02-06

## 2019-09-30 NOTE — TELEPHONE ENCOUNTER
Hong Malhotra from [facility] informed of Dr. Janna Friend message/results/orders below. Hong Malhotra verbalized understanding and requests prescription to be sent to [facility] in [facility].  Prescription sent to Dr. Dustin Howe for review and sign off.

## 2019-10-18 ENCOUNTER — TELEPHONE (OUTPATIENT)
Dept: PULMONOLOGY | Facility: CLINIC | Age: 44
End: 2019-10-18

## 2019-10-18 DIAGNOSIS — Z01.818 PREOPERATIVE TESTING: Primary | ICD-10-CM

## 2019-10-18 NOTE — TELEPHONE ENCOUNTER
Mother states pt is having eye surgery at the [facility] eye clinic on 12/19/19 and is requesting an order for EKG. Mother is also requesting an appt with Dr. Paul Lopez on 11/26/19 or 12/2/19. Mother also requesting appt in the evening at 5pm or after.   Mother al

## 2019-10-21 NOTE — TELEPHONE ENCOUNTER
Dr. Stephanie Raines- Please read message below. Mother requesting orders for EKG and BMP. Appointment scheduled for 12/2/19 at 4:45PM. Left message for mother to call office back to verify.

## 2019-10-22 NOTE — TELEPHONE ENCOUNTER
Patients mother/Alicia calling informed of appointment scheduled 12/2/19 4:45PM and declined. Apologizes and now asking for an earlier time slot, please call at:[phone],please leave detailed voice message,thanks.

## 2019-10-22 NOTE — TELEPHONE ENCOUNTER
Appt for 12/2 canceled (see message below). Appointment scheduled for 11/26/19 at 2:30PM. Left message on mother's voicemail to call office back to verify appointment.

## 2019-10-22 NOTE — TELEPHONE ENCOUNTER
Left detailed message for mother regarding Dr. Rohith Bustos orders and to call office back to verify appt (11/26/19 at 2:30PM)

## 2019-11-29 ENCOUNTER — APPOINTMENT (OUTPATIENT)
Dept: LAB | Facility: HOSPITAL | Age: 44
End: 2019-11-29
Attending: FAMILY MEDICINE
Payer: MEDICARE

## 2019-11-29 ENCOUNTER — HOSPITAL ENCOUNTER (OUTPATIENT)
Dept: GENERAL RADIOLOGY | Facility: HOSPITAL | Age: 44
Discharge: HOME | End: 2019-11-29
Attending: FAMILY MEDICINE
Payer: MEDICARE

## 2019-11-29 DIAGNOSIS — R05.9 COUGH: ICD-10-CM

## 2019-11-29 DIAGNOSIS — Z01.818 PREOPERATIVE TESTING: ICD-10-CM

## 2019-11-29 PROCEDURE — 93005 ELECTROCARDIOGRAM TRACING: CPT

## 2019-11-29 PROCEDURE — 71046 X-RAY EXAM CHEST 2 VIEWS: CPT | Performed by: FAMILY MEDICINE

## 2019-11-29 PROCEDURE — 93010 ELECTROCARDIOGRAM REPORT: CPT | Performed by: INTERNAL MEDICINE

## 2019-12-05 ENCOUNTER — TELEPHONE (OUTPATIENT)
Dept: PULMONOLOGY | Facility: CLINIC | Age: 44
End: 2019-12-05

## 2019-12-05 NOTE — TELEPHONE ENCOUNTER
Pts mother would like recent EKG and/or  chest xray results faxed to [facility] at fax # [phone]: Shant Regalado at [facility]    Please fax results - pt does not need a call back. OK to leave detailed message.

## 2020-02-06 RX ORDER — AMLODIPINE BESYLATE 10 MG/1
TABLET ORAL
Qty: 29 TABLET | Refills: 10 | Status: SHIPPED | OUTPATIENT
Start: 2020-02-06 | End: 2021-01-14

## 2020-07-22 ENCOUNTER — TELEPHONE (OUTPATIENT)
Dept: PULMONOLOGY | Facility: CLINIC | Age: 45
End: 2020-07-22

## 2020-07-22 NOTE — TELEPHONE ENCOUNTER
Spoke to Yobani at [facility] regarding PA for medication below. Informed Yobani patient has been approved for medication from 4/18/2020- 7/17/2021. Yobani verified medication would process through system and would notify patient.      Zeferino

## 2020-08-13 ENCOUNTER — TELEPHONE (OUTPATIENT)
Dept: PULMONOLOGY | Facility: CLINIC | Age: 45
End: 2020-08-13

## 2020-08-13 NOTE — TELEPHONE ENCOUNTER
Current Outpatient Medications   Medication Sig Dispense Refill   CalcitrateTab 950mg     Not on med list

## 2020-08-14 NOTE — TELEPHONE ENCOUNTER
Chitra del angel last fill of Calcitrate tab 950 mg was 9/10/19 by Dr. Mary Beth Tracey. Explained no record of this medication for pt & refill request should go through outside ordering physician. She voiced understanding.

## 2020-08-20 ENCOUNTER — TELEPHONE (OUTPATIENT)
Dept: PULMONOLOGY | Facility: CLINIC | Age: 45
End: 2020-08-20

## 2020-08-20 NOTE — TELEPHONE ENCOUNTER
Ligia/Donovan called to request order for MRI of kidneys. Pt is supposed to have one every 2 yrs and last one was in 2018. Please fax order to [phone].

## 2020-08-20 NOTE — TELEPHONE ENCOUNTER
RN, please call back [facility] to explain that he now has a physician more closely affiliated with [facility]. The information can be gained from his mother David Carcamo.   If they need my help, I am always available; however, I believe he now has a new prim

## 2020-08-25 NOTE — TELEPHONE ENCOUNTER
Request for Continuance of therapy prescription: New Prescription:      Calcitrate Tab 950mg  - Sig:  Take 2 Tablets (400mg) by mouth once daily.     Current Outpatient Medications   Medication Sig Dispense Refill   • Calcium Citrate 200 MG Oral Tab Take 40

## 2020-08-27 ENCOUNTER — TELEPHONE (OUTPATIENT)
Dept: PULMONOLOGY | Facility: CLINIC | Age: 45
End: 2020-08-27

## 2020-08-27 NOTE — TELEPHONE ENCOUNTER
Patient's Mom called in stating there is a message from Dr. Breana Bai in [facility] and she does not have anyway to get to the message. I attempted to see if she needed to get over to [employer] but she prefers the patient not use it.  She thinks it may be i

## 2020-08-28 NOTE — TELEPHONE ENCOUNTER
Skylar Díaz was notified that RN unable to locate recent [address] from our office.  She st[facility] and she [facility]'s physicians over to [facility], but would like our office to still receive info re: pt & to discuss pt's car

## 2020-09-25 ENCOUNTER — TELEPHONE (OUTPATIENT)
Dept: PULMONOLOGY | Facility: CLINIC | Age: 45
End: 2020-09-25

## 2020-09-25 NOTE — TELEPHONE ENCOUNTER
Pls see TE 8/20/20 for Dr. Kana Quevedo as well as TE 8/27/20. Explained to Sushma Veronica that pt has a new physician more closely affiliated w/ [facility] & any requests for medication refill should go through his new PCP. He voiced understanding.  Per Sushma Veronica he will

## 2021-01-14 RX ORDER — AMLODIPINE BESYLATE 10 MG/1
TABLET ORAL
Qty: 31 TABLET | Refills: 11 | Status: SHIPPED | OUTPATIENT
Start: 2021-01-14

## 2021-04-05 ENCOUNTER — OFFICE VISIT (OUTPATIENT)
Dept: ALLERGY | Facility: CLINIC | Age: 46
End: 2021-04-05
Payer: MEDICARE

## 2021-04-05 VITALS
WEIGHT: 135 LBS | HEIGHT: 67 IN | DIASTOLIC BLOOD PRESSURE: 87 MMHG | SYSTOLIC BLOOD PRESSURE: 136 MMHG | HEART RATE: 73 BPM | BODY MASS INDEX: 21.19 KG/M2

## 2021-04-05 DIAGNOSIS — J30.1 SEASONAL ALLERGIC RHINITIS DUE TO POLLEN: Primary | ICD-10-CM

## 2021-04-05 DIAGNOSIS — J30.89 ALLERGIC RHINITIS DUE TO AMERICAN HOUSE DUST MITE: ICD-10-CM

## 2021-04-05 PROCEDURE — 99214 OFFICE O/P EST MOD 30 MIN: CPT | Performed by: ALLERGY & IMMUNOLOGY

## 2021-04-05 NOTE — PROGRESS NOTES
Jaspreet Craig is a 51year old male. HPI:   Patient presents with: Allergies: pt in for f/u seasonal allergies, doing well    Patient is a 79-year-old male who presents for follow-up with a chief complaint of allergies.   Patient is present with his Oral Tab Take 1,100 mg by mouth. • Fluocinolone Acetonide (DERMOTIC) 0.01 % Otic Oil Place  in ear(s). • gabapentin (NEURONTIN) 300 MG Oral Cap Take  by mouth. • Emollient (CETAPHIL THERAPEUTIC HAND) Apply Externally Cream Apply  topically. heartbeat/palpitations, chest pain, edema  Constitutional:  Negative night sweats,weight loss, irritability and lethargy  ENMT:  Negative for ear drainage, hearing loss.  See hpi   Eyes:  Negative for eye discharge and vision loss  Gastrointestinal:  Collette Hamilton encounter       Imaging & Referrals:  None     4/5/2021  Emmanuel Kc, MD    If medication samples were provided today, they were provided solely for patient education and training related to self administration of these medications.   Teaching, instruc

## 2022-01-04 RX ORDER — AMLODIPINE BESYLATE 10 MG/1
TABLET ORAL
Qty: 31 TABLET | Refills: 10 | OUTPATIENT
Start: 2022-01-04

## 2022-01-04 NOTE — TELEPHONE ENCOUNTER
Last office visit: 7/29/19  Last refill: 1/14/21      Called patient, spoke with mom states prescription should of been sent to his PCP. No longer sees Dr. Davin Starr unless its an emergency.    Disregard refill request

## 2023-06-08 ENCOUNTER — TELEPHONE (OUTPATIENT)
Dept: PULMONOLOGY | Facility: CLINIC | Age: 48
End: 2023-06-08

## 2023-06-08 NOTE — TELEPHONE ENCOUNTER
I reviewed the report of the MRI most recently and nothing is changed over the last 10 months. I discussed at length with the patient's wife. They were referred for an opinion by urology and I encouraged her to keep the appointment and seek further opinion; however, it seems reassuring that the lesions have not changed in size.

## 2023-06-08 NOTE — TELEPHONE ENCOUNTER
Spoke with patient's mother, Naveen Cook, states patient is a current  resident of [address] [place] [address]. Dr. Sherwin Sales - Mother requesting if you would look at patient's recent MRI in [facility] from 5/1/23 and 7/22/22 for your opinion. She has images if you need them.  Patient was referred to surgeon, but she would like your opinion even though patient has not seen you since 2019

## 2023-06-08 NOTE — TELEPHONE ENCOUNTER
Patient's mother is calling to have dr. Christoph Mosley review patients recent MRI if possible would an appointment as soon as possible due to results that were given to them

## 2023-07-25 ENCOUNTER — OFFICE VISIT (OUTPATIENT)
Dept: DERMATOLOGY CLINIC | Facility: CLINIC | Age: 48
End: 2023-07-25

## 2023-07-25 DIAGNOSIS — L60.3 ONYCHODYSTROPHY: Primary | ICD-10-CM

## 2023-07-25 DIAGNOSIS — L82.1 SEBORRHEIC KERATOSIS: ICD-10-CM

## 2023-07-25 PROCEDURE — 99202 OFFICE O/P NEW SF 15 MIN: CPT | Performed by: STUDENT IN AN ORGANIZED HEALTH CARE EDUCATION/TRAINING PROGRAM

## 2023-07-25 NOTE — PROGRESS NOTES
New Patient    Referred by: No referring provider defined for this encounter. CHIEF COMPLAINT: Lesion of concern     HISTORY OF PRESENT ILLNESS: Lily Canlaes is a 51year old male here for evaluation of lesion of concern. 1. Mole   Location: Left foot  Duration: 2-3 months   Signs and symptoms: None  Current treatment: No  Past treatments: None    2. Toe fungus   Location: left foot    Duration: Couple years  Signs and symptoms: Bruising  Current treatment: None  Past treatments: None    Personal Dermatologic History  History of skin cancer: No  History of  atypical moles: No    FAMILY HISTORY:  History of melanoma: No    Past Medical History  Past Medical History:   Diagnosis Date    Allergy     Extrinsic asthma, unspecified     Seizure disorder ([facility] [address]     Tuberous sclerosis ([facility] [address]     Unspecified essential hypertension        REVIEW OF SYSTEMS:  Constitutional: Denies fever, chills, unintentional weight loss. Skin as per HPI    Medications  Current Outpatient Medications   Medication Sig Dispense Refill    AMLODIPINE BESYLATE 10 MG Oral Tab TAKE 1 TABLET BY MOUTH EVERY MORNING (HYPERTENSION) 31 tablet 11    Calcium Citrate 200 MG Oral Tab Take 400 mg by mouth daily. (Patient not taking: Reported on 4/5/2021 ) 120 tablet 2    docusate sodium (COLACE) 100 MG Oral Cap Take 1 capsule (100 mg total) by mouth 2 (two) times daily. (Patient not taking: Reported on 4/5/2021 ) 60 capsule 0    Pravastatin Sodium (PRAVACHOL) 20 MG Oral Tab Take 1 tablet (20 mg total) by mouth nightly. 30 tablet 5    primidone 50 MG Oral Tab Take 200 mg by mouth. THERAPEUTIC MULTIVIT/MINERAL Oral Tab Take 1 tablet by mouth.      carbamazepine 100 MG Oral Chew Tab Chew 400 mg by mouth.      carbamazepine 100 MG Oral Chew Tab Chew 300 mg by mouth.      carbamazepine 200 MG Oral Tab Take 400 mg by mouth. Pravastatin Sodium (PRAVACHOL) 10 MG Oral Tab Take 1 tablet (10 mg total) by mouth nightly.  (Patient not taking: Reported on 4/5/2021 ) 30 tablet 5    Calcium Citrate 250 MG Oral Tab Take  by mouth. (Patient not taking: Reported on 4/5/2021 )      carbamazepine (TEGRETOL) 200 MG Oral Tab Take 1,100 mg by mouth. EPINEPHrine (EPIPEN 2-SHADI) 0.3 MG/0.3ML Injection Solution Auto-injector inject 1 by Intramuscular route      Fluocinolone Acetonide (DERMOTIC) 0.01 % Otic Oil Place  in ear(s). gabapentin (NEURONTIN) 300 MG Oral Cap Take  by mouth. Emollient (CETAPHIL THERAPEUTIC HAND) Apply Externally Cream Apply  topically. GuaiFENesin ER (MUCINEX) 600 MG Oral Tablet 12 Hr Take  by mouth. hydrocortisone 1 % Apply Externally Cream Apply  topically. lisinopril (PRINIVIL,ZESTRIL) 20 MG Oral Tab Take  by mouth.      loratadine (CLARITIN) 10 MG Oral Tab Take  by mouth. Multiple Vitamins-Minerals (DAILY MULTIPLE VITAMINS/MIN) Oral Tab Take  by mouth.      bacitracin zinc-neomycin sulfate-polymyxin B sulfate (NEOSPORIN ORIGINAL) 3.5-400-5000 Apply Externally Ointment Apply  topically. primidone (MYSOLINE) 50 MG Oral Tab Take 150 mg by mouth. PHYSICAL EXAM:  General: awake, alert, no acute distress  Neuropsych: appropriate mood and affect  Eyes: Sclerae anicteric, without conjunctival injection, eyelids unremarkable  Skin: Skin exam was performed today including the following: L foot . Pertinent findings include:   - 1st toenail with onychodystrophy   - with a brown stuck on papule    ASSESSMENT & PLAN:  Pathophysiology of diagnoses discussed with patient. Therapeutic options reviewed. Risks, benefits, and alternatives discussed with patient. Instructions reviewed at length. #Seborrheic keratosis   - Reassured patient regarding benign nature of lesion. No treatment but observation at this time. Follow-up for concerning physical changes or new symptoms.     #Onychomycosis  - Reassured and recommended keeping nails neatly trimmed       Return to clinic: as needed or sooner if something concerning arises     Tejas Sue MD

## 2023-09-22 ENCOUNTER — HOSPITAL ENCOUNTER (EMERGENCY)
Facility: HOSPITAL | Age: 48
Discharge: HOME | End: 2023-09-22
Attending: EMERGENCY MEDICINE
Payer: MEDICARE

## 2023-09-22 VITALS
OXYGEN SATURATION: 99 % | DIASTOLIC BLOOD PRESSURE: 92 MMHG | HEIGHT: 66 IN | TEMPERATURE: 99 F | SYSTOLIC BLOOD PRESSURE: 144 MMHG | HEART RATE: 88 BPM | RESPIRATION RATE: 18 BRPM | BODY MASS INDEX: 23.46 KG/M2 | WEIGHT: 146 LBS

## 2023-09-22 DIAGNOSIS — Z48.89 ENCOUNTER FOR POST SURGICAL WOUND CHECK: Primary | ICD-10-CM

## 2023-09-22 PROCEDURE — 99282 EMERGENCY DEPT VISIT SF MDM: CPT

## 2023-09-25 ENCOUNTER — HOSPITAL ENCOUNTER (OUTPATIENT)
Facility: HOSPITAL | Age: 48
Setting detail: OBSERVATION
Discharge: HOME | End: 2023-09-27
Attending: EMERGENCY MEDICINE | Admitting: HOSPITALIST
Payer: MEDICARE

## 2023-09-25 ENCOUNTER — APPOINTMENT (OUTPATIENT)
Dept: CT IMAGING | Facility: HOSPITAL | Age: 48
End: 2023-09-25
Attending: EMERGENCY MEDICINE
Payer: MEDICARE

## 2023-09-25 DIAGNOSIS — C71.9 ASTROCYTOMA BRAIN TUMOR (HCC): ICD-10-CM

## 2023-09-25 DIAGNOSIS — T42.1X4A: Primary | ICD-10-CM

## 2023-09-25 PROBLEM — Q85.1 TUBEROUS SCLEROSIS (HCC): Status: ACTIVE | Noted: 2023-09-25

## 2023-09-25 PROBLEM — G40.909 SEIZURE DISORDER (HCC): Status: ACTIVE | Noted: 2023-09-25

## 2023-09-25 PROBLEM — G91.9 HYDROCEPHALUS (HCC): Status: ACTIVE | Noted: 2023-09-25

## 2023-09-25 PROBLEM — R26.89 BALANCE PROBLEM: Status: ACTIVE | Noted: 2023-09-25

## 2023-09-25 LAB
ANION GAP SERPL CALC-SCNC: 7 MMOL/L (ref 0–18)
ATRIAL RATE: 111 BPM
BASOPHILS # BLD AUTO: 0.04 X10(3) UL (ref 0–0.2)
BASOPHILS NFR BLD AUTO: 0.3 %
BILIRUB UR QL: NEGATIVE
BUN BLD-MCNC: 21 MG/DL (ref 7–18)
BUN/CREAT SERPL: 14 (ref 10–20)
CALCIUM BLD-MCNC: 8.9 MG/DL (ref 8.5–10.1)
CARBAMAZEPINE SERPL-MCNC: 21.2 UG/ML (ref 4–12)
CHLORIDE SERPL-SCNC: 103 MMOL/L (ref 98–112)
CLARITY UR: CLEAR
CO2 SERPL-SCNC: 26 MMOL/L (ref 21–32)
COLOR UR: YELLOW
CREAT BLD-MCNC: 1.5 MG/DL
DEPRECATED RDW RBC AUTO: 42.8 FL (ref 35.1–46.3)
EGFRCR SERPLBLD CKD-EPI 2021: 57 ML/MIN/1.73M2 (ref 60–?)
EOSINOPHIL # BLD AUTO: 0.09 X10(3) UL (ref 0–0.7)
EOSINOPHIL NFR BLD AUTO: 0.7 %
ERYTHROCYTE [DISTWIDTH] IN BLOOD BY AUTOMATED COUNT: 13 % (ref 11–15)
GLUCOSE BLD-MCNC: 160 MG/DL (ref 70–99)
GLUCOSE BLDC GLUCOMTR-MCNC: 147 MG/DL (ref 70–99)
GLUCOSE UR-MCNC: NEGATIVE MG/DL
HCT VFR BLD AUTO: 37.3 %
HGB BLD-MCNC: 12.7 G/DL
IMM GRANULOCYTES # BLD AUTO: 0.04 X10(3) UL (ref 0–1)
IMM GRANULOCYTES NFR BLD: 0.3 %
KETONES UR-MCNC: NEGATIVE MG/DL
LEUKOCYTE ESTERASE UR QL STRIP.AUTO: NEGATIVE
LYMPHOCYTES # BLD AUTO: 1.04 X10(3) UL (ref 1–4)
LYMPHOCYTES NFR BLD AUTO: 7.6 %
MCH RBC QN AUTO: 30.9 PG (ref 26–34)
MCHC RBC AUTO-ENTMCNC: 34 G/DL (ref 31–37)
MCV RBC AUTO: 90.8 FL
MONOCYTES # BLD AUTO: 1.4 X10(3) UL (ref 0.1–1)
MONOCYTES NFR BLD AUTO: 10.3 %
NEUTROPHILS # BLD AUTO: 11.04 X10 (3) UL (ref 1.5–7.7)
NEUTROPHILS # BLD AUTO: 11.04 X10(3) UL (ref 1.5–7.7)
NEUTROPHILS NFR BLD AUTO: 80.8 %
NITRITE UR QL STRIP.AUTO: NEGATIVE
OSMOLALITY SERPL CALC.SUM OF ELEC: 288 MOSM/KG (ref 275–295)
P AXIS: 69 DEGREES
P-R INTERVAL: 142 MS
PH UR: 6 [PH] (ref 5–8)
PLATELET # BLD AUTO: 208 10(3)UL (ref 150–450)
POTASSIUM SERPL-SCNC: 3.8 MMOL/L (ref 3.5–5.1)
Q-T INTERVAL: 324 MS
QRS DURATION: 98 MS
QTC CALCULATION (BEZET): 440 MS
R AXIS: 74 DEGREES
RBC # BLD AUTO: 4.11 X10(6)UL
SODIUM SERPL-SCNC: 136 MMOL/L (ref 136–145)
SP GR UR STRIP: <=1.005 (ref 1–1.03)
T AXIS: 58 DEGREES
UROBILINOGEN UR STRIP-ACNC: 0.2
VENTRICULAR RATE: 111 BPM
WBC # BLD AUTO: 13.7 X10(3) UL (ref 4–11)

## 2023-09-25 PROCEDURE — 99222 1ST HOSP IP/OBS MODERATE 55: CPT | Performed by: HOSPITALIST

## 2023-09-25 PROCEDURE — 70498 CT ANGIOGRAPHY NECK: CPT | Performed by: EMERGENCY MEDICINE

## 2023-09-25 PROCEDURE — 70496 CT ANGIOGRAPHY HEAD: CPT | Performed by: EMERGENCY MEDICINE

## 2023-09-25 PROCEDURE — 99223 1ST HOSP IP/OBS HIGH 75: CPT | Performed by: STUDENT IN AN ORGANIZED HEALTH CARE EDUCATION/TRAINING PROGRAM

## 2023-09-25 RX ORDER — PRIMIDONE 50 MG/1
150 TABLET ORAL EVERY MORNING
Status: DISCONTINUED | OUTPATIENT
Start: 2023-09-26 | End: 2023-09-27

## 2023-09-25 RX ORDER — ACETAMINOPHEN 500 MG
500 TABLET ORAL EVERY 4 HOURS PRN
Status: DISCONTINUED | OUTPATIENT
Start: 2023-09-25 | End: 2023-09-27

## 2023-09-25 RX ORDER — GABAPENTIN 300 MG/1
300 CAPSULE ORAL 3 TIMES DAILY
Status: DISCONTINUED | OUTPATIENT
Start: 2023-09-25 | End: 2023-09-27

## 2023-09-25 RX ORDER — PROCHLORPERAZINE EDISYLATE 5 MG/ML
5 INJECTION INTRAMUSCULAR; INTRAVENOUS EVERY 8 HOURS PRN
Status: DISCONTINUED | OUTPATIENT
Start: 2023-09-25 | End: 2023-09-27

## 2023-09-25 RX ORDER — OMEGA-3/DHA/EPA/FISH OIL 60 MG-90MG
2 CAPSULE ORAL DAILY
COMMUNITY

## 2023-09-25 RX ORDER — PRIMIDONE 50 MG/1
200 TABLET ORAL NIGHTLY
COMMUNITY

## 2023-09-25 RX ORDER — HEPARIN SODIUM 5000 [USP'U]/ML
5000 INJECTION, SOLUTION INTRAVENOUS; SUBCUTANEOUS EVERY 12 HOURS SCHEDULED
Status: DISCONTINUED | OUTPATIENT
Start: 2023-09-25 | End: 2023-09-27

## 2023-09-25 RX ORDER — AMLODIPINE BESYLATE 10 MG/1
10 TABLET ORAL EVERY MORNING
Status: DISCONTINUED | OUTPATIENT
Start: 2023-09-26 | End: 2023-09-27

## 2023-09-25 RX ORDER — PRAVASTATIN SODIUM 20 MG
20 TABLET ORAL NIGHTLY
Status: DISCONTINUED | OUTPATIENT
Start: 2023-09-25 | End: 2023-09-25

## 2023-09-25 RX ORDER — SODIUM CHLORIDE 9 MG/ML
INJECTION, SOLUTION INTRAVENOUS CONTINUOUS
Status: DISCONTINUED | OUTPATIENT
Start: 2023-09-25 | End: 2023-09-27

## 2023-09-25 RX ORDER — PRIMIDONE 50 MG/1
200 TABLET ORAL NIGHTLY
Status: DISCONTINUED | OUTPATIENT
Start: 2023-09-25 | End: 2023-09-27

## 2023-09-25 RX ORDER — ONDANSETRON 2 MG/ML
4 INJECTION INTRAMUSCULAR; INTRAVENOUS EVERY 6 HOURS PRN
Status: DISCONTINUED | OUTPATIENT
Start: 2023-09-25 | End: 2023-09-27

## 2023-09-25 RX ORDER — LISINOPRIL 20 MG/1
20 TABLET ORAL DAILY
Status: DISCONTINUED | OUTPATIENT
Start: 2023-09-26 | End: 2023-09-27

## 2023-09-25 RX ORDER — PRAVASTATIN SODIUM 10 MG
30 TABLET ORAL NIGHTLY
Status: DISCONTINUED | OUTPATIENT
Start: 2023-09-25 | End: 2023-09-27

## 2023-09-25 RX ORDER — MULTIPLE VITAMINS W/ MINERALS TAB 9MG-400MCG
1 TAB ORAL DAILY
Status: DISCONTINUED | OUTPATIENT
Start: 2023-09-26 | End: 2023-09-27

## 2023-09-25 NOTE — ED INITIAL ASSESSMENT (HPI)
Patient arrives via ems from home, per ems patient's mother states during breakfast patient had slurred speech and an unsteady gait. Unknown time of breakfast. Patient a/o x 4, hx of MR. Pt reports feeling \"wobbly. \"

## 2023-09-25 NOTE — H&P
[facility]    PATIENT'S NAME: Chaparrita Hawley   ATTENDING PHYSICIAN: Margarita Nazario. Swapnil Matthews MD   PATIENT ACCOUNT#:   [id number]    LOCATION:  [address]  MEDICAL RECORD #:   [id number]       YOB: 1975  ADMISSION DATE:       09/25/2023    HISTORY AND PHYSICAL EXAMINATION    CHIEF COMPLAINT:  Tegretol toxicity. HISTORY OF PRESENT ILLNESS:  The patient is a 54-year-old  male with history of tuberous sclerosis who presented to the emergency department for slurred speech and inability to stand up because of weakness per his mother since this morning. CBC showed white blood cell count of 13.7 with left shift. Chemistry showed GFR of 57, which is at baseline; glucose 160, BUN and creatinine of 21 and 1.50. Tegretol level 21.2, which is above therapeutic level. Urinalysis showed no evidence of urinary tract infection. 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 PAST MEDICAL HISTORY:  Hypertension, hyperlipidemia, tuberous sclerosis, and known brain astrocytoma. He had left kidney angiomyolipoma 5 cm, required arterial embolization recently as outlined below. He has a history of seizure disorder. PAST SURGICAL HISTORY:  None. MEDICATIONS:  Please see medication reconciliation list.  For seizures, he is on Tegretol, Neurontin, and primidone. ALLERGIES:  No known drug allergies. FAMILY HISTORY:  Positive for hypertension. SOCIAL HISTORY:  No tobacco, alcohol, or drug use. Lives in a day care facility. Usually independent in his basic activities of daily living. REVIEW OF SYSTEMS:  Per the mother, he had left kidney angiomyolipoma embolization procedure done by Interventional Radiology a few days ago and since then he has been having low-grade fevers at home. His appetite might have been less than before, but this morning he was noted to be slurring his speech and unable to stand up on his own from a sitting position. The patient himself is not complaining of any symptoms. Other 12-point review of systems is negative. PHYSICAL EXAMINATION:    GENERAL:  Alert. Able to follow simple commands. VITAL SIGNS:  Temperature 99.2, pulse 106, respiratory rate 18, blood pressure 149/100, pulse ox 96% on room air. HEENT:  Atraumatic. Oropharynx clear. Dry mucous membranes. NECK:  Supple. No lymphadenopathy. Trachea midline. Full range of motion. LUNGS:  Clear to auscultation bilaterally. Normal respiratory effort. HEART:  Regular rate and rhythm. S1 and S2 auscultated. No murmur. ABDOMEN:  Soft, nondistended. No tenderness. Positive bowel sounds. EXTREMITIES:  No peripheral edema, clubbing, or cyanosis. NEUROLOGIC:  Motor and sensory intact. ASSESSMENT:    1. Tegretol toxicity with generalized weakness. 2.   Mild kidney injury and dehydration. 3.   Recent left kidney angiomyolipoma arterial embolization with low-grade fever. PLAN:  The patient will be admitted to general medical floor. IV fluids. Monitor his neurological status. Hold evening Tegretol dose and repeat level in the morning. Obtain Neurology consult. Place him on fall precautions. Further recommendations to follow. Dictated By Helen Wahl MD  d: 09/25/2023 12:35:19  t: 09/25/2023 12:46:26  Job [id number]  FB/    cc: Eduardo Harper MD

## 2023-09-25 NOTE — ED QUICK NOTES
Attempted to walk patient to the bathroom. Patient extremely unsteady and needed 2 people to assist him.

## 2023-09-25 NOTE — PROGRESS NOTES
Received patient from ER. Mother at [facility] 23. Patient denies pain or discomfort. . no slurred speech. Patient lives at [address]. Had a recent kidney procedure- right groin site intact no signs of infection VIANEY. Per mother he has been running a low grade temp since. He has been staying with her at her house the pass couple of days and not at his home. Able to ambulate with 1 assist and walker. Temp 100.4 at 4pm. Tylenol given. Temp at 6pm 101. 1. orders for blood cultures. patient answering question appropriately and following commands. is aware to ask for help. Call light within reach and explained. Alarms in place.

## 2023-09-25 NOTE — ED QUICK NOTES
.Orders for admission, patient is aware of plan and ready to go upstairs. Any questions, please call ED RN Abelardo Goode at extension [phone]. Type of COVID test sent:  COVID Suspicion level: Low/High  Titratable drug(s) infusing:  Rate:  LOC at time of transport: alert and oriented  Other pertinent information: Patient's mother at cartside.  Patient extremely unsteady

## 2023-09-26 ENCOUNTER — APPOINTMENT (OUTPATIENT)
Dept: MRI IMAGING | Facility: HOSPITAL | Age: 48
End: 2023-09-26
Attending: STUDENT IN AN ORGANIZED HEALTH CARE EDUCATION/TRAINING PROGRAM
Payer: MEDICARE

## 2023-09-26 LAB
ANION GAP SERPL CALC-SCNC: 9 MMOL/L (ref 0–18)
BASOPHILS # BLD AUTO: 0.02 X10(3) UL (ref 0–0.2)
BASOPHILS NFR BLD AUTO: 0.2 %
BUN BLD-MCNC: 14 MG/DL (ref 7–18)
BUN/CREAT SERPL: 10.6 (ref 10–20)
CALCIUM BLD-MCNC: 8.6 MG/DL (ref 8.5–10.1)
CARBAMAZEPINE SERPL-MCNC: 9.1 UG/ML (ref 4–12)
CHLORIDE SERPL-SCNC: 105 MMOL/L (ref 98–112)
CO2 SERPL-SCNC: 25 MMOL/L (ref 21–32)
CREAT BLD-MCNC: 1.32 MG/DL
DEPRECATED RDW RBC AUTO: 41.9 FL (ref 35.1–46.3)
EGFRCR SERPLBLD CKD-EPI 2021: 66 ML/MIN/1.73M2 (ref 60–?)
EOSINOPHIL # BLD AUTO: 0.12 X10(3) UL (ref 0–0.7)
EOSINOPHIL NFR BLD AUTO: 1.1 %
ERYTHROCYTE [DISTWIDTH] IN BLOOD BY AUTOMATED COUNT: 12.8 % (ref 11–15)
GLUCOSE BLD-MCNC: 109 MG/DL (ref 70–99)
HCT VFR BLD AUTO: 34.5 %
HGB BLD-MCNC: 11.6 G/DL
IMM GRANULOCYTES # BLD AUTO: 0.03 X10(3) UL (ref 0–1)
IMM GRANULOCYTES NFR BLD: 0.3 %
LYMPHOCYTES # BLD AUTO: 0.88 X10(3) UL (ref 1–4)
LYMPHOCYTES NFR BLD AUTO: 8 %
MCH RBC QN AUTO: 29.9 PG (ref 26–34)
MCHC RBC AUTO-ENTMCNC: 33.6 G/DL (ref 31–37)
MCV RBC AUTO: 88.9 FL
MONOCYTES # BLD AUTO: 1.34 X10(3) UL (ref 0.1–1)
MONOCYTES NFR BLD AUTO: 12.2 %
NEUTROPHILS # BLD AUTO: 8.59 X10 (3) UL (ref 1.5–7.7)
NEUTROPHILS # BLD AUTO: 8.59 X10(3) UL (ref 1.5–7.7)
NEUTROPHILS NFR BLD AUTO: 78.2 %
OSMOLALITY SERPL CALC.SUM OF ELEC: 289 MOSM/KG (ref 275–295)
PLATELET # BLD AUTO: 198 10(3)UL (ref 150–450)
POTASSIUM SERPL-SCNC: 4.1 MMOL/L (ref 3.5–5.1)
RBC # BLD AUTO: 3.88 X10(6)UL
SODIUM SERPL-SCNC: 139 MMOL/L (ref 136–145)
WBC # BLD AUTO: 11 X10(3) UL (ref 4–11)

## 2023-09-26 PROCEDURE — 99233 SBSQ HOSP IP/OBS HIGH 50: CPT | Performed by: HOSPITALIST

## 2023-09-26 PROCEDURE — 99233 SBSQ HOSP IP/OBS HIGH 50: CPT | Performed by: STUDENT IN AN ORGANIZED HEALTH CARE EDUCATION/TRAINING PROGRAM

## 2023-09-26 PROCEDURE — 70553 MRI BRAIN STEM W/O & W/DYE: CPT | Performed by: STUDENT IN AN ORGANIZED HEALTH CARE EDUCATION/TRAINING PROGRAM

## 2023-09-26 RX ORDER — CARBAMAZEPINE 200 MG/1
400 TABLET ORAL DAILY
Status: DISCONTINUED | OUTPATIENT
Start: 2023-09-27 | End: 2023-09-27

## 2023-09-26 RX ORDER — CARBAMAZEPINE 200 MG/1
400 TABLET ORAL NIGHTLY
Status: DISCONTINUED | OUTPATIENT
Start: 2023-09-26 | End: 2023-09-27

## 2023-09-26 RX ORDER — CARBAMAZEPINE 200 MG/1
300 TABLET ORAL EVERY 24 HOURS
Status: DISCONTINUED | OUTPATIENT
Start: 2023-09-26 | End: 2023-09-27

## 2023-09-26 RX ORDER — GADOTERATE MEGLUMINE 376.9 MG/ML
15 INJECTION INTRAVENOUS
Status: COMPLETED | OUTPATIENT
Start: 2023-09-26 | End: 2023-09-26

## 2023-09-26 NOTE — PLAN OF CARE
Patient alert and oriented on room air with no complaints of pain. MRI completed overnight. Q4 neuros maintained with no acute changes in status. Mother at bedside overnight. Carbamezapine continued overnight. Call light in reach and no needs noted at this time  Problem: Patient Centered Care  Goal: Patient preferences are identified and integrated in the patient's plan of care  Description: Interventions:  - What would you like us to know as we care for you?   - Provide timely, complete, and accurate information to patient/family  - Incorporate patient and family knowledge, values, beliefs, and cultural backgrounds into the planning and delivery of care  - Encourage patient/family to participate in care and decision-making at the level they choose  - Honor patient and family perspectives and choices  9/26/2023 0802 by Obi Chen  Outcome: Progressing  9/26/2023 0802 by Obi Chen  Outcome: Progressing     Problem: Patient/Family Goals  Goal: Patient/Family Long Term Goal  Description: Patient's Long Term Goal:     Interventions:  -   - See additional Care Plan goals for specific interventions  9/26/2023 0802 by Obi Chen  Outcome: Progressing  9/26/2023 0802 by Obi Chen  Outcome: Progressing  Goal: Patient/Family Short Term Goal  Description: Patient's Short Term Goal:     Interventions:   -   - See additional Care Plan goals for specific interventions  9/26/2023 0802 by Obi Chen  Outcome: Progressing  9/26/2023 0802 by Obi Chen  Outcome: Progressing     Problem: NEUROLOGICAL - ADULT  Goal: Achieves stable or improved neurological status  Description: INTERVENTIONS  - Assess for and report changes in neurological status  - Initiate measures to prevent increased intracranial pressure  - Maintain blood pressure and fluid volume within ordered parameters to optimize cerebral perfusion and minimize risk of hemorrhage  - Monitor temperature, glucose, and sodium.  Initiate appropriate interventions as ordered  Outcome: Progressing  Goal: Absence of seizures  Description: INTERVENTIONS  - Monitor for seizure activity  - Administer anti-seizure medications as ordered  - Monitor neurological status  Outcome: Progressing     Problem: Impaired Functional Mobility  Goal: Achieve highest/safest level of mobility/gait  Description: Interventions:  - Assess patient's functional ability and stability  - Promote increasing activity/tolerance for mobility and gait  - Educate and engage patient/family in tolerated activity level and precautions    Outcome: Progressing

## 2023-09-26 NOTE — PLAN OF CARE
Alert x3. Denies pain. Remains afebrile. Ambulating with standby assist. Remains on iv fluids at 50ml. Seen by neurology. Neuro checks Qshift. Tegretol level normal medications unheld. Alarms in place call light within reach. Problem: Patient Centered Care  Goal: Patient preferences are identified and integrated in the patient's plan of care  Description: Interventions:  - What would you like us to know as we care for you? I live at [place] [address]  - Provide timely, complete, and accurate information to patient/family  - Incorporate patient and family knowledge, values, beliefs, and cultural backgrounds into the planning and delivery of care  - Encourage patient/family to participate in care and decision-making at the level they choose  - Honor patient and family perspectives and choices  Outcome: Progressing     Problem: Patient/Family Goals  Goal: Patient/Family Long Term Goal  Description: Patient's Long Term Goal:     Interventions:  -   - See additional Care Plan goals for specific interventions  Outcome: Progressing  Goal: Patient/Family Short Term Goal  Description: Patient's Short Term Goal: to go home    Interventions:   - normal lab levels  -remain fever free  - See additional Care Plan goals for specific interventions  Outcome: Progressing     Problem: NEUROLOGICAL - ADULT  Goal: Achieves stable or improved neurological status  Description: INTERVENTIONS  - Assess for and report changes in neurological status  - Initiate measures to prevent increased intracranial pressure  - Maintain blood pressure and fluid volume within ordered parameters to optimize cerebral perfusion and minimize risk of hemorrhage  - Monitor temperature, glucose, and sodium.  Initiate appropriate interventions as ordered  Outcome: Progressing  Goal: Absence of seizures  Description: INTERVENTIONS  - Monitor for seizure activity  - Administer anti-seizure medications as ordered  - Monitor neurological status  Outcome: Progressing Problem: Impaired Functional Mobility  Goal: Achieve highest/safest level of mobility/gait  Description: Interventions:  - Assess patient's functional ability and stability  - Promote increasing activity/tolerance for mobility and gait  - Educate and engage patient/family in tolerated activity level and precautions  - Recommend use of  RW for transfers and ambulation  Outcome: Progressing

## 2023-09-27 VITALS
HEIGHT: 67 IN | HEART RATE: 105 BPM | OXYGEN SATURATION: 96 % | SYSTOLIC BLOOD PRESSURE: 187 MMHG | BODY MASS INDEX: 23.26 KG/M2 | RESPIRATION RATE: 18 BRPM | DIASTOLIC BLOOD PRESSURE: 93 MMHG | TEMPERATURE: 99 F | WEIGHT: 148.19 LBS

## 2023-09-27 LAB
ANION GAP SERPL CALC-SCNC: 6 MMOL/L (ref 0–18)
BASOPHILS # BLD AUTO: 0.04 X10(3) UL (ref 0–0.2)
BASOPHILS NFR BLD AUTO: 0.5 %
BUN BLD-MCNC: 16 MG/DL (ref 7–18)
BUN/CREAT SERPL: 12.5 (ref 10–20)
CALCIUM BLD-MCNC: 8.7 MG/DL (ref 8.5–10.1)
CHLORIDE SERPL-SCNC: 104 MMOL/L (ref 98–112)
CO2 SERPL-SCNC: 27 MMOL/L (ref 21–32)
CREAT BLD-MCNC: 1.28 MG/DL
DEPRECATED RDW RBC AUTO: 42.4 FL (ref 35.1–46.3)
EGFRCR SERPLBLD CKD-EPI 2021: 69 ML/MIN/1.73M2 (ref 60–?)
EOSINOPHIL # BLD AUTO: 0.22 X10(3) UL (ref 0–0.7)
EOSINOPHIL NFR BLD AUTO: 2.6 %
ERYTHROCYTE [DISTWIDTH] IN BLOOD BY AUTOMATED COUNT: 12.9 % (ref 11–15)
GLUCOSE BLD-MCNC: 107 MG/DL (ref 70–99)
HCT VFR BLD AUTO: 35.7 %
HGB BLD-MCNC: 11.9 G/DL
IMM GRANULOCYTES # BLD AUTO: 0.03 X10(3) UL (ref 0–1)
IMM GRANULOCYTES NFR BLD: 0.4 %
LYMPHOCYTES # BLD AUTO: 1.2 X10(3) UL (ref 1–4)
LYMPHOCYTES NFR BLD AUTO: 14 %
MCH RBC QN AUTO: 30.2 PG (ref 26–34)
MCHC RBC AUTO-ENTMCNC: 33.3 G/DL (ref 31–37)
MCV RBC AUTO: 90.6 FL
MONOCYTES # BLD AUTO: 0.93 X10(3) UL (ref 0.1–1)
MONOCYTES NFR BLD AUTO: 10.9 %
NEUTROPHILS # BLD AUTO: 6.14 X10 (3) UL (ref 1.5–7.7)
NEUTROPHILS # BLD AUTO: 6.14 X10(3) UL (ref 1.5–7.7)
NEUTROPHILS NFR BLD AUTO: 71.6 %
OSMOLALITY SERPL CALC.SUM OF ELEC: 286 MOSM/KG (ref 275–295)
PLATELET # BLD AUTO: 229 10(3)UL (ref 150–450)
POTASSIUM SERPL-SCNC: 3.7 MMOL/L (ref 3.5–5.1)
RBC # BLD AUTO: 3.94 X10(6)UL
SODIUM SERPL-SCNC: 137 MMOL/L (ref 136–145)
WBC # BLD AUTO: 8.6 X10(3) UL (ref 4–11)

## 2023-09-27 RX ORDER — GABAPENTIN 300 MG/1
300 CAPSULE ORAL 3 TIMES DAILY
Status: SHIPPED | COMMUNITY
Start: 2023-09-27

## 2023-09-27 RX ORDER — LISINOPRIL 10 MG/1
10 TABLET ORAL DAILY
Status: DISCONTINUED | OUTPATIENT
Start: 2023-09-27 | End: 2023-09-27

## 2023-09-27 NOTE — DISCHARGE INSTRUCTIONS
REPEAT CARBAMAZEPINE LEVEL, CBC AND RENAL PANEL IN 1 WEEK    FOLLOW UP WITH NEUROLOGY   ESTABLISH FOLLOW UP WITH NEUROSURGERY   DISCUSS WITH YOUR NEUROLOGIST RISKS/BENEFITS OF TAKING LOW DOSE OF ASPIRIN  CONSIDER FOLLOW UP WITH GI BECAUSE OF HISTORY OF RECTAL BLEEDS WHILE ON ASPIRIN BEFORE

## 2023-09-27 NOTE — PLAN OF CARE
Patient alert and oriented on room air with no complaints of pain. Patient walking to the bathroom overnight ok without a walker; standby. Mother at the bedside. Q shift neuro completed with no acute changes. Call light in reach and no needs noted at this time. Problem: Patient Centered Care  Goal: Patient preferences are identified and integrated in the patient's plan of care  Description: Interventions:  - What would you like us to know as we care for you?   - Provide timely, complete, and accurate information to patient/family  - Incorporate patient and family knowledge, values, beliefs, and cultural backgrounds into the planning and delivery of care  - Encourage patient/family to participate in care and decision-making at the level they choose  - Honor patient and family perspectives and choices  Outcome: Progressing     Problem: Patient/Family Goals  Goal: Patient/Family Long Term Goal  Description: Patient's Long Term Goal:     Interventions:  - See additional Care Plan goals for specific interventions  Outcome: Progressing  Goal: Patient/Family Short Term Goal  Description: Patient's Short Term Goal:     Interventions:   - See additional Care Plan goals for specific interventions  Outcome: Progressing     Problem: NEUROLOGICAL - ADULT  Goal: Achieves stable or improved neurological status  Description: INTERVENTIONS  - Assess for and report changes in neurological status  - Initiate measures to prevent increased intracranial pressure  - Maintain blood pressure and fluid volume within ordered parameters to optimize cerebral perfusion and minimize risk of hemorrhage  - Monitor temperature, glucose, and sodium.  Initiate appropriate interventions as ordered  Outcome: Progressing  Goal: Absence of seizures  Description: INTERVENTIONS  - Monitor for seizure activity  - Administer anti-seizure medications as ordered  - Monitor neurological status  Outcome: Progressing     Problem: Impaired Functional Mobility  Goal: Achieve highest/safest level of mobility/gait  Description: Interventions:  - Assess patient's functional ability and stability  - Promote increasing activity/tolerance for mobility and gait  - Educate and engage patient/family in tolerated activity level and precautions  Outcome: Progressing

## 2023-09-27 NOTE — PLAN OF CARE
Vss.  Low grade fever. Resolved on its own. Iv fluids. Bed low, locked. Side rails up x2. Call light and belongings within reach. Non skid socks on. Encouraged to call for assistance when needed. Pt calls appropriately. Frequent rounds. Pts mom at bedside. Anticipate discharge home today  Problem: Patient Centered Care  Goal: Patient preferences are identified and integrated in the patient's plan of care  Description: Interventions:  - What would you like us to know as we care for you? Lives at [address] [place] [address]. - Provide timely, complete, and accurate information to patient/family  - Incorporate patient and family knowledge, values, beliefs, and cultural backgrounds into the planning and delivery of care  - Encourage patient/family to participate in care and decision-making at the level they choose  - Honor patient and family perspectives and choices  Outcome: Progressing     Problem: Patient/Family Goals  Goal: Patient/Family Long Term Goal  Description: Patient's Long Term Goal: go home    Interventions:  -  monitor labs  Neuro consult   Iv fluds    - See additional Care Plan goals for specific interventions  Outcome: Progressing  Goal: Patient/Family Short Term Goal  Description: Patient's Short Term Goal: feel better    Interventions:   - medication adjustments  Monitor labs  Neuro consult  Daily neuro assessment  Tele monitoring  - See additional Care Plan goals for specific interventions  Outcome: Progressing     Problem: NEUROLOGICAL - ADULT  Goal: Achieves stable or improved neurological status  Description: INTERVENTIONS  - Assess for and report changes in neurological status  - Initiate measures to prevent increased intracranial pressure  - Maintain blood pressure and fluid volume within ordered parameters to optimize cerebral perfusion and minimize risk of hemorrhage  - Monitor temperature, glucose, and sodium.  Initiate appropriate interventions as ordered  Outcome: Progressing  Goal: Absence of seizures  Description: INTERVENTIONS  - Monitor for seizure activity  - Administer anti-seizure medications as ordered  - Monitor neurological status  Outcome: Progressing

## 2023-09-28 ENCOUNTER — PATIENT OUTREACH (OUTPATIENT)
Dept: CASE MANAGEMENT | Age: 48
End: 2023-09-28

## 2023-09-28 NOTE — PROGRESS NOTES
TCM chart review. No TCM as patient follows with outside [facility] PCP. Encounter closing.

## 2024-09-02 ENCOUNTER — HOSPITAL ENCOUNTER (OUTPATIENT)
Age: 49
Discharge: ANOTHER HOSPITAL | End: 2024-09-02
Payer: MEDICARE

## 2024-09-02 ENCOUNTER — HOSPITAL ENCOUNTER (EMERGENCY)
Facility: HOSPITAL | Age: 49
Discharge: HOME | End: 2024-09-02
Attending: EMERGENCY MEDICINE
Payer: MEDICARE

## 2024-09-02 VITALS
SYSTOLIC BLOOD PRESSURE: 149 MMHG | HEART RATE: 74 BPM | OXYGEN SATURATION: 100 % | DIASTOLIC BLOOD PRESSURE: 90 MMHG | RESPIRATION RATE: 20 BRPM | TEMPERATURE: 97 F

## 2024-09-02 VITALS
TEMPERATURE: 98 F | BODY MASS INDEX: 23 KG/M2 | OXYGEN SATURATION: 98 % | SYSTOLIC BLOOD PRESSURE: 145 MMHG | WEIGHT: 148.13 LBS | DIASTOLIC BLOOD PRESSURE: 87 MMHG | RESPIRATION RATE: 22 BRPM | HEART RATE: 82 BPM

## 2024-09-02 DIAGNOSIS — R42 DIZZINESS: Primary | ICD-10-CM

## 2024-09-02 LAB
ALBUMIN SERPL-MCNC: 4.6 G/DL (ref 3.2–4.8)
ALP LIVER SERPL-CCNC: 72 U/L
ALT SERPL-CCNC: 22 U/L
ANION GAP SERPL CALC-SCNC: 6 MMOL/L (ref 0–18)
AST SERPL-CCNC: 29 U/L (ref ?–34)
BASOPHILS # BLD AUTO: 0.03 X10(3) UL (ref 0–0.2)
BASOPHILS NFR BLD AUTO: 0.6 %
BILIRUB DIRECT SERPL-MCNC: <0.1 MG/DL (ref ?–0.3)
BILIRUB SERPL-MCNC: 0.3 MG/DL (ref 0.3–1.2)
BUN BLD-MCNC: 18 MG/DL (ref 9–23)
BUN/CREAT SERPL: 13 (ref 10–20)
CALCIUM BLD-MCNC: 9.5 MG/DL (ref 8.7–10.4)
CARBAMAZEPINE SERPL-MCNC: 13.4 UG/ML (ref 4–12)
CHLORIDE SERPL-SCNC: 102 MMOL/L (ref 98–112)
CO2 SERPL-SCNC: 28 MMOL/L (ref 21–32)
CREAT BLD-MCNC: 1.38 MG/DL
DEPRECATED RDW RBC AUTO: 40.2 FL (ref 35.1–46.3)
EGFRCR SERPLBLD CKD-EPI 2021: 62 ML/MIN/1.73M2 (ref 60–?)
EOSINOPHIL # BLD AUTO: 0.07 X10(3) UL (ref 0–0.7)
EOSINOPHIL NFR BLD AUTO: 1.4 %
ERYTHROCYTE [DISTWIDTH] IN BLOOD BY AUTOMATED COUNT: 12.5 % (ref 11–15)
GLUCOSE BLD-MCNC: 98 MG/DL (ref 70–99)
HCT VFR BLD AUTO: 38.8 %
HGB BLD-MCNC: 13.7 G/DL
IMM GRANULOCYTES # BLD AUTO: 0.01 X10(3) UL (ref 0–1)
IMM GRANULOCYTES NFR BLD: 0.2 %
LYMPHOCYTES # BLD AUTO: 0.89 X10(3) UL (ref 1–4)
LYMPHOCYTES NFR BLD AUTO: 18.1 %
MCH RBC QN AUTO: 30.9 PG (ref 26–34)
MCHC RBC AUTO-ENTMCNC: 35.3 G/DL (ref 31–37)
MCV RBC AUTO: 87.6 FL
MONOCYTES # BLD AUTO: 0.55 X10(3) UL (ref 0.1–1)
MONOCYTES NFR BLD AUTO: 11.2 %
NEUTROPHILS # BLD AUTO: 3.37 X10 (3) UL (ref 1.5–7.7)
NEUTROPHILS # BLD AUTO: 3.37 X10(3) UL (ref 1.5–7.7)
NEUTROPHILS NFR BLD AUTO: 68.5 %
OSMOLALITY SERPL CALC.SUM OF ELEC: 284 MOSM/KG (ref 275–295)
PLATELET # BLD AUTO: 242 10(3)UL (ref 150–450)
POTASSIUM SERPL-SCNC: 4.4 MMOL/L (ref 3.5–5.1)
PROT SERPL-MCNC: 8.1 G/DL (ref 5.7–8.2)
RBC # BLD AUTO: 4.43 X10(6)UL
SODIUM SERPL-SCNC: 136 MMOL/L (ref 136–145)
WBC # BLD AUTO: 4.9 X10(3) UL (ref 4–11)

## 2024-09-02 PROCEDURE — 93010 ELECTROCARDIOGRAM REPORT: CPT

## 2024-09-02 PROCEDURE — 99214 OFFICE O/P EST MOD 30 MIN: CPT

## 2024-09-02 PROCEDURE — 85025 COMPLETE CBC W/AUTO DIFF WBC: CPT | Performed by: EMERGENCY MEDICINE

## 2024-09-02 PROCEDURE — 99283 EMERGENCY DEPT VISIT LOW MDM: CPT

## 2024-09-02 PROCEDURE — 93005 ELECTROCARDIOGRAM TRACING: CPT

## 2024-09-02 PROCEDURE — 80048 BASIC METABOLIC PNL TOTAL CA: CPT | Performed by: EMERGENCY MEDICINE

## 2024-09-02 PROCEDURE — 80076 HEPATIC FUNCTION PANEL: CPT | Performed by: EMERGENCY MEDICINE

## 2024-09-02 PROCEDURE — 99284 EMERGENCY DEPT VISIT MOD MDM: CPT

## 2024-09-02 PROCEDURE — 80156 ASSAY CARBAMAZEPINE TOTAL: CPT | Performed by: EMERGENCY MEDICINE

## 2024-09-02 PROCEDURE — 36415 COLL VENOUS BLD VENIPUNCTURE: CPT

## 2024-09-02 NOTE — ED PROVIDER NOTES
Patient Seen in: [facility]      History     Chief Complaint   Patient presents with    Dizziness     Stated Complaint: dizzy  Subjective:   Minesh is a 49-year-old male with a history of hydrocephalus, and astrocytoma, seizures, asthma and hypertension presenting to the immediate care with his mom.  Patient normally lives at [facility] and was visiting mom for the weekend.  Mom states that this morning after the patient took a shower he had an episode where he appeared dizzy to her where he was holding the walls and seemed off balance.  She states the episode lasted a few minutes.  Patient did not complain of anything during the episode, before or after the episode however mom states that he is not normally a complainer.  She states that the patient did not fall to the ground or have a syncopal episode.  He denies current dizziness, headache, chest pain, shortness of breath.  Mom states that he has not been sick lately.  Patient did get a COVID-vaccine.  States that there was a mistake with the timing of his lisinopril yesterday -patient normally takes it at night but forgot so he took a dose yesterday morning and also took a dose yesterday evening at his normal time.        Objective:   Past Medical History:    Allergy    Extrinsic asthma, unspecified    Seizure disorder (HCC)    Tuberous sclerosis (HCC)    Unspecified essential hypertension            Past Surgical History:   Procedure Laterality Date    Allergy desensitization allp      Other Right     Metal Implant in right hip               No pertinent social history.          Review of Systems    Positive for stated complaint: Dizziness    Other systems are as noted in HPI.  Constitutional and vital signs reviewed.      All other systems reviewed and negative except as noted above.    Physical Exam     ED Triage Vitals [09/02/24 1154]   /90   Pulse 74   Resp 20   Temp 97.3 °F (36.3 °C)   Temp src Temporal   SpO2 100 %   O2 Device  None (Room air)     Current:/90   Pulse 74   Temp 97.3 °F (36.3 °C) (Temporal)   Resp 20   SpO2 100%     Physical Exam  Vitals and nursing note reviewed.   Constitutional:       General: He is not in acute distress.     Appearance: Normal appearance. He is not ill-appearing, toxic-appearing or diaphoretic.   HENT:      Head: Normocephalic.   Cardiovascular:      Rate and Rhythm: Normal rate.   Pulmonary:      Effort: Pulmonary effort is normal.   Musculoskeletal:         General: Normal range of motion.      Cervical back: Normal range of motion.   Skin:     General: Skin is warm and dry.      Capillary Refill: Capillary refill takes less than 2 seconds.   Neurological:      General: No focal deficit present.      Mental Status: He is alert and oriented to person, place, and time.      GCS: GCS eye subscore is 4. GCS verbal subscore is 5. GCS motor subscore is 6.      Motor: Motor function is intact.      Coordination: Coordination is intact.      Gait: Gait is intact.   Psychiatric:         Mood and Affect: Mood normal.         Behavior: Behavior normal.         Thought Content: Thought content normal.         Judgment: Judgment normal.         ED Course   Radiology:  No results found.  Labs Reviewed - No data to display  EKG    Rate, intervals and axes as noted on EKG Report.  Rate: 79  Rhythm: Sinus Rhythm  Reading: New incomplete right bundle branch block.           MDM     Medical Decision Making  Differential diagnoses reflecting the complexity of care include but are not limited to vasovagal episode, hydrocephalus, TIA, CVA, other acute intracranial process.    Comorbidities that add complexity to management include: hydrocephalus, and astrocytoma, seizures, asthma and hypertension  History obtained by an independent source was from: Mother  Patient is well appearing, non-toxic and in no acute distress.  Vital signs are stable.   49-year-old male with significant medical history presenting to the  immediate care with his mom who is providing history.  Patient had an episode of dizziness and ataxia this morning that lasted a few minutes.  No gross neurologic deficits now.  EKG shows new incomplete right bundle branch block.  No chest pain.  Given the limitations of the immediate care and the likely need for advanced imaging unavailable here due to the patient's history they will be sent to the emergency department for further evaluation and management.  I recommended that they go to the [facility] emergency department.  Patient discussed with Dr. Lin who agrees with this plan.      Amount and/or Complexity of Data Reviewed  ECG/medicine tests: ordered and independent interpretation performed. Decision-making details documented in ED Course.        Disposition and Plan     Clinical Impression:  1. Dizziness         Disposition:  Ic to ed  9/2/2024 12:24 pm    Follow-up:  No follow-up provider specified.        Medications Prescribed:  Discharge Medication List as of 9/2/2024 12:34 PM

## 2024-09-02 NOTE — DISCHARGE INSTRUCTIONS
Please take half dose of tegretol tonight and tomorrow morning, then continue with normal dosing.

## 2024-09-02 NOTE — ED INITIAL ASSESSMENT (HPI)
Patient with an episode of dizziness this morning after showering.  Mom states he was unsteady and was holding onto the walls.  Patient received a covid vaccine on Friday.  Patient did take a dose of lisinopril  late the day prior as well.

## 2024-09-02 NOTE — ED INITIAL ASSESSMENT (HPI)
Pt to ED from [facility] with family with concerns for dizziness after shower this am. Family states pt was holding wall after shower. Family states pt appears his normal at this time. Family states sent to ED for further evaluation due to \"complex medical hx\". No respiratory distress noted. Pt ambulating by self with steady gait.   EKG done at [facility]. EKG reviewed by Dr Clements and no need to repeat EKG in triage.

## 2024-09-02 NOTE — ED PROVIDER NOTES
Patient Seen in: [facility] Emergency Department    History     Chief Complaint   Patient presents with    Dizziness       HPI    49-year-old male with past medical history significant for asthma, seizure disorder, tuberous sclerosis, high blood pressure, presents emergency department for an episode of dizziness this morning that lasted about 30 minutes.  Incident occurred after he had taken a shower this morning.  By the time his mother got ready to bring him to urgent care, he was feeling better.  Patient was seen at urgent care and then referred to the ER for further workup.  Mom is at bedside and states that patient had forgotten to take his blood pressure medicine 2 nights ago.  He normally takes the medicine before going to bed.  She had him take it the following morning which was yesterday morning and he subsequently took his night dose of his blood pressure medicine as well.  She believes he may have gotten somewhat lightheaded due to the extra dose of medicine.  Patient is denying any pain, lightheadedness, weakness, nausea.    History from Independent Source: Given initial history as stated in HPI    External Records Reviewed: Reviewed prior history available in EMR.  Patient was admitted in September of last year after he had some slurred speech and weakness with difficulty in ambulating.  He is found to have an elevated Tegretol level at that time.  Reviewed EKG from urgent care which shows heart rate of 79 bpm, normal sinus rhythm, incomplete right bundle branch block, no acute ST changes, normal axis and intervals, no ectopy    History reviewed.   Past Medical History:    Allergy    Extrinsic asthma, unspecified    Seizure disorder (HCC)    Tuberous sclerosis (HCC)    Unspecified essential hypertension       History reviewed.   Past Surgical History:   Procedure Laterality Date    Allergy desensitization allp      Other Right     Metal Implant in right hip          Medications :  (Not in a  hospital admission)       Family History   Problem Relation Age of Onset    Skin cancer Father     Diabetes Father     Asthma Mother     Glaucoma Mother     Prostate Cancer Other     Cancer Other         lung     Hypertension Other        Smoking Status:   Social History     Socioeconomic History    Marital status: Single   Tobacco Use    Smoking status: Never    Smokeless tobacco: Never   Substance and Sexual Activity    Alcohol use: No    Drug use: No   Other Topics Concern    Caffeine Concern Yes     Comment: tea     Reaction to local anesthetic No    Pt has a pacemaker No    Pt has a defibrillator No       Constitutional and vital signs reviewed.      Social History and Family History elements reviewed from today, pertinent positives to the presenting problem noted.    Physical Exam     ED Triage Vitals [09/02/24 1314]   /88   Pulse 72   Resp 22   Temp 97.8 °F (36.6 °C)   Temp src Temporal   SpO2 99 %   O2 Device None (Room air)       Physical Exam   Constitutional: AAOx3, well nourished, NAD  HEENT: Normocephalic, PERRLA, MMM  CV: s1s2+, RRR, no m/r/g, normal distal pulses  Pulmonary/Chest: CTA b/l with no rales, wheezes.  No chest wall tenderness  Abdominal: Nontender.  Nondistended. Soft. Bowel sounds are normal.   Neck/Back:   :   Musculoskeletal: Normal range of motion. No deformity.   Neurological: Awake, alert. Normal reflexes. No cranial nerve deficit.    Skin: Skin is warm and dry. No rash noted. No erythema.   Psychiatric:      All measures to prevent infection transmission during my interaction with the patient were taken. The patient was already wearing a droplet mask on my arrival to the room. Personal protective equipment was worn throughout the duration of the exam.      ED Course        Labs Reviewed   BASIC METABOLIC PANEL (8) - Abnormal; Notable for the following components:       Result Value    Creatinine 1.38 (*)     All other components within normal limits   CBC WITH DIFFERENTIAL  WITH PLATELET - Abnormal; Notable for the following components:    HCT 38.8 (*)     Lymphocyte Absolute 0.89 (*)     All other components within normal limits   CARBAMAZEPINE (TEGRETOL) TOTAL - Abnormal; Notable for the following components:    Carbamazepine (Tegretol) 13.4 (*)     All other components within normal limits   HEPATIC FUNCTION PANEL (7) - Normal     My Independent Interpretation of EKG (if performed):     Monitor Interpretation:   normal sinus rhythm as interpreted by me.      Imaging Results Available and Reviewed while in ED: No results found.  ED Medications Administered: Medications - No data to display          MDM     Vitals:    09/02/24 1314   BP: 138/88   Pulse: 72   Resp: 22   Temp: 97.8 °F (36.6 °C)   TempSrc: Temporal   SpO2: 99%   Weight: 67.2 kg     *I personally reviewed and interpreted all ED vitals.    Independent Interpretation of Studies:     Social Determinants of Health:     Procedures:      Differential/MDM/Shared Decision Making: Differential Diagnosis includes Tegretol toxicity, accidental medication overdose, vasovagal episode, vertigo, CVA, electrolyte abnormality, others.      The patient already has sclerosis, seizure disorder, asthma, high blood pressure, to contribute to the complexity of this ED evaluation.           Patient's Tegretol level is mildly elevated at 13.  Remainder of workup, EKG, vitals, are all unremarkable and patient is asymptomatic.  Discussed case with mother and will have her hold 1 dose of Tegretol before restarting.      Condition upon leaving the department: Stable    Disposition and Plan     Clinical Impression:  1. Dizziness        Disposition:  Discharge    Follow-up:  Cole Grijalva DO  [address] [address]  [phone]    Call in 2 day(s)        Medications Prescribed:  Current Discharge Medication List

## 2024-09-03 LAB
ATRIAL RATE: 79 BPM
P AXIS: 84 DEGREES
P-R INTERVAL: 158 MS
Q-T INTERVAL: 362 MS
QRS DURATION: 104 MS
QTC CALCULATION (BEZET): 415 MS
R AXIS: 86 DEGREES
T AXIS: 74 DEGREES
VENTRICULAR RATE: 79 BPM

## 2025-05-22 ENCOUNTER — HOSPITAL ENCOUNTER (OUTPATIENT)
Dept: MRI IMAGING | Age: 50
Discharge: HOME | End: 2025-05-22
Attending: UROLOGY
Payer: MEDICARE

## 2025-05-22 DIAGNOSIS — D17.71 ANGIOMYOLIPOMA OF LEFT KIDNEY: ICD-10-CM

## 2025-05-22 PROCEDURE — A9575 INJ GADOTERATE MEGLUMI 0.1ML: HCPCS | Performed by: RADIOLOGY

## 2025-05-22 PROCEDURE — 74183 MRI ABD W/O CNTR FLWD CNTR: CPT | Performed by: UROLOGY

## 2025-05-22 RX ORDER — GADOTERATE MEGLUMINE 376.9 MG/ML
15 INJECTION INTRAVENOUS
Status: COMPLETED | OUTPATIENT
Start: 2025-05-22 | End: 2025-05-22

## 2025-05-22 RX ADMIN — GADOTERATE MEGLUMINE 15 ML: 376.9 INJECTION INTRAVENOUS at 08:28:00

## (undated) NOTE — LETTER
12/31/2018              Jillian Lee [place] [id number]      To whom it may concern,    Augie Corado allergy shots/immunotherapy have been discontinued      Sincerely,    MD Kathia Martinez [facility]

## (undated) NOTE — LETTER
September 2, 2024     Patient: Minesh Estrdaa   MR Number: [id number]   YOB: 1975   Date of Visit: 9/2/2024        Dear Doctor:    Minesh Estrada was recently seen and treated in the Immediate care.  We did an EKG that showed a new right bundle branch block.      Given his medical history and episode of dizziness and trouble walking this morning, my recommendation was that he be taken to the Emergency Department for further evaluation.       If you have any questions or concerns, please don't hesitate to call.    Sincerely,        YOLIS Jacome

## (undated) NOTE — MR AVS SNAPSHOT
[facility]  [address] [place] [id number] [phone]               Thank you for choosing us for your health care visit with Jeremías Pearson MD.  We are glad to serve you and happy to provide you with this summary of your visit. CETAPHIL THERAPEUTIC HAND Crea   Apply  topically. CLARITIN 10 MG Tabs   Generic drug:  loratadine   Take  by mouth. * THERAPEUTIC MULTIVIT/MINERAL Tabs   Take 1 tablet by mouth.            * DAILY MULTIPLE VITAMINS/MIN Tabs   Take  by visit,  view other health information, and more. To sign up or find more information, go to [email or web address] and click on the Sign Up Now link in the [employer] box.      Enter your [employer] Activation Code exactly as it appears below along with yo

## (undated) NOTE — MR AVS SNAPSHOT
[facility] [address] SPECIALTY [facility] - [place] [address] [address] [address] [phone]               Thank you for choosing us for your health care visit with Dorina Langston MD.  We are glad to serve you and happy to provide you with this summary o breastfeeding for 24-48 hours after the procedure. IF A CHILD is scheduled for this procedure, parents should be advised that they will not be able to accompany the child in the testing room.  Parents will remain in the MRI waiting area and be reunite Generic drug:  GuaiFENesin ER   Take  by mouth. * primidone 50 MG Tabs   Take 200 mg by mouth. Commonly known as: MYSOLINE           * MYSOLINE 50 MG Tabs   Generic drug:  primidone   Take  by mouth.            NEURONTIN 300 MG Caps   Generic d

## (undated) NOTE — MR AVS SNAPSHOT
[facility] SPECIALTY [facility] - [place] [address] [address] [address]  [phone]               Thank you for choosing us for your health care visit with Nurse. We are glad to serve you and happy to provide you with this summary of your visit. Generic drug:  GuaiFENesin ER   Take  by mouth. * primidone 50 MG Tabs   Take 200 mg by mouth. Commonly known as: MYSOLINE           * MYSOLINE 50 MG Tabs   Generic drug:  primidone   Take  by mouth.            NEURONTIN 300 MG Caps   Generic d

## (undated) NOTE — LETTER
4/18/2018             RE: Lazara Holliday        [address]         To Whom It May Concern,    Gaston Quan is a patient of mine. He is developmentally disabled since birth and he is unable to care for himself.  Thank you for

## (undated) NOTE — LETTER
12/19/18      Rashawn Escudero  [address] [facility]          I have reviewed your labs results from [facility] labs and your cholesterol was just slightly elevated at 208.  Please just continue with your diet and I look forward to seeing you at yo

## (undated) NOTE — LETTER
12/29/2017    Dear Adrien Ryan,    It has come to our attention that the enclosed required test is due in January. This test was ordered by Dr. Kana Quevedo.     No appointment necessary for this test..    If you are allergic to iodine or have any question

## (undated) NOTE — LETTER
9/10/2019              Pedro Estrada        [address] [facility]         Dear Yariel Martinez records indicate that the tests ordered for you by Ligia Horta MD  have not been done.   If you have, in fact, already completed the

## (undated) NOTE — LETTER
Date & Time: 9/2/2024, 4:20 PM  Patient: Minesh Estrada  Encounter Provider(s):    Jacek Fu MD       To Whom It May Concern:    Minesh Estrada was seen and treated in our department on 9/2/2024. He  can return to his residence.  Give half dose of tegretol tonight and tomorrow morning, then can resume normal dosing thereafter .    If you have any questions or concerns, please do not hesitate to call.        _____________________________  Physician/APC Signature